# Patient Record
Sex: FEMALE | Race: WHITE
[De-identification: names, ages, dates, MRNs, and addresses within clinical notes are randomized per-mention and may not be internally consistent; named-entity substitution may affect disease eponyms.]

---

## 2017-07-07 NOTE — ED
Lower Extremity





- HPI Summary


HPI Summary: 





Patient presents with right foot "abscess" since 2 weeks.  There is one 1.5x2cm 

erythematous area to the right base of the lateral great toe with central 

avulsion of skin with brown sanguineous drainage and one 1x1cm area similar in 

appearance to the right achilles.  She denies wearing shoes or anything rubbing 

against the skin to create a blister.  PMHx includes raynauds, "allergic to sun,

" connective tissue disorder, fibromyalgia, anxiety and depression.  Denies HA, 

fevers, chills or sweats.





- History of Current Complaint


Chief Complaint: EDRashSkinAbscess


Stated Complaint: SORES ON RT FOOT


Time Seen by Provider: 17 19:50


Hx Obtained From: Patient


Pain Intensity: 7


Pain Scale Used: 0-10 Numeric


Timing: Constant


Character Of Pain: Aching


Associated Signs And Symptoms: Positive: Redness


Aggravating Factor(s): Standing, Ambulation


Alleviating Factor(s): Rest


Able to Bear Weight: No





- Risk Factors


Gout Risk Factors: Obesity, Peripherial Vascular Disease


DVT Risk Factors: Recent Period Of Bedrest


Septic Arthritis Risk Factor: Immunosuppressed, Pre-existing Joint Disease





- Allergies/Home Medications


Allergies/Adverse Reactions: 


 Allergies











Allergy/AdvReac Type Severity Reaction Status Date / Time


 


Clarithromycin [From Biaxin] Allergy  Unknown Verified 16 17:33





   Reaction  





   Details  














PMH/Surg Hx/FS Hx/Imm Hx


Previously Healthy: Yes


Endocrine/Hematology History: 


   Denies: Hx Diabetes


Cardiovascular History: 


   Denies: Hx Congestive Heart Failure, Hx Hypertension


 History: 


   Denies: Hx Renal Disease





- Surgical History


Surgery Procedure, Year, and Place: CHOLECYSTECTOMY, , TUBAL LIGATION





- Immunization History


Hx Pertussis Vaccination: No


Immunizations Up to Date: Unable to Obtain/Confirm


Infectious Disease History: No


Infectious Disease History: 


   Denies: Traveled Outside the [place] in Last 30 Days





- Social History


Occupation: Unemployed


Lives: With Family


Alcohol Use: None


Hx Substance Use: No


Substance Use Type: Reports: None


Hx Tobacco Use: Yes


Smoking Status (MU): Heavy Every Day Tobacco Smoker


Do You Chew or Dip Tobacco: Yes





Review of Systems


Constitutional: Negative


Eyes: Negative


Cardiovascular: Negative


Respiratory: Negative


Positive: no symptoms reported, see HPI


Musculoskeletal: Negative


Positive: Other - see HPI


Neurological: Negative


Psychological: Normal


All Other Systems Reviewed And Are Negative: Yes





Physical Exam


Triage Information Reviewed: Yes


Vital Signs On Initial Exam: 


 Initial Vitals











Temp Pulse Resp BP Pulse Ox


 


 96.9 F   106   20   125/85   98 


 


 17 19:21  17 19:21  17 19:21  17 19:21  17 19:21











Vital Signs Reviewed: Yes


Appearance: Positive: Well-Appearing, No Pain Distress, Well-Nourished


Skin: Positive: Warm, Skin Color Reflects Adequate Perfusion, Other - see HPI


Head/Face: Positive: Normal Head/Face Inspection


Eyes: Positive: EOMI, MIKAL


Neck: Positive: Supple, No Lymphadenopathy


Respiratory/Lung Sounds: Positive: Clear to Auscultation, Breath Sounds Present


Cardiovascular: Positive: Normal, RRR, Pulses are Symmetrical in both Upper and 

Lower Extremities


Musculoskeletal: Positive: Normal, Strength/ROM Intact


Neurological: Positive: Normal, Sensory/Motor Intact, Alert, Oriented to Person 

Place, Time


Psychiatric: Positive: Normal





Diagnostics





- Vital Signs


 Vital Signs











  Temp Pulse Resp BP Pulse Ox


 


 17 20:34  97.4 F  87  16  108/67  100


 


 17 19:21  96.9 F  106  20  125/85  98














- Laboratory


Lab Statement: Any lab studies that have been ordered have been reviewed, and 

results considered in the medical decision making process.





Lower Extremity Course/Dx





- Course


Course Of Treatment: Patient presents with right foot "abscess" since 2 weeks.  

There is one 1.5x2cm erythematous area to the right base of the lateral great 

toe with central avulsion of skin with brown sanguineous drainage and one 1x1cm 

area similar in appearance to the right achilles.  The area is slightly swollen 

with tenderness and warmth.  She denies wearing shoes or anything rubbing 

against the skin to create a blister.  PMHx includes raynauds, "allergic to sun,

" connective tissue disorder, fibromyalgia, anxiety and depression.  Denies HA, 

fevers, chills or sweats. Patient given keflex for potential infection.  Cleaned

, telfa dressing applied and gauze wrapped. Encouraged removal of wraps in 1 x 

day and leave open to air. Patient OK with discharge.





- Diagnoses


Differential Diagnosis/HQI/PQRI: Positive: Other - cellulitis, abscess, skin 

disorder


Provider Diagnoses: 


 Cellulitis








Discharge





- Discharge Plan


Condition: Stable


Disposition: HOME


Prescriptions: 


Cephalexin CAP* [Keflex CAP*] 500 mg PO QID #28 cap MDD 4


Patient Education Materials:  Cellulitis (ED)


Referrals: 


Aime ARIAS,Juan EUBANKS [Primary Care Provider] - 


Additional Instructions: 


Follow up with PCP


Leave open to air starting tomorrow


Keflex 4 times daily for 7 days


Avoid shoes rubbing on the area.


Continue with warm soaks with epsom salt

## 2017-08-31 NOTE — ED
Substance Abuse/Use





- HPI Summary


HPI Summary: 





Patient presents with symptoms of withdrawal from Paxil.  She has run out of 

the medication 3 days ago and has attempted to call her PCP office for over a 

week without having a call back.  She notes to anxiety, insomnia, shaking spells

, body aches, twitches and irritability. She currently takes 20mg Paxil daily 

for symptoms of polymyalgia rheumatica, depression, body aches and insomnia.  

She states the medication has helped with her symptoms. She denies fevers, 

sweats or chills.  Denies sick contacts or feelings of ill.   at 

bedside.  Denies SI/HI or self harm.  Denies psych history, but states she 

notes to intermittent depression which has been well controlled with her Paxil.

  Denies HA, abdominal pain or urinary symptoms. 





- History Of Current Complaint


Chief Complaint: EDGeneral


Stated Complaint: WITHDRAWL FROM PAXEL/ANXIETY


Time Seen by Provider: 17 00:50


Hx Obtained From: Patient


Pregnant?: No


Onset/Duration  of Drug/ETOH Abuse: Days


Ingestion History: Type/Name Of Drug - ssri


Severity Initially: Moderate


Severity Currently: Moderate


Character: Depressed, Fearful, Angry


Aggravating Factor(s): Nothing


Alleviating Factor(s): Nothing


Associated Signs And Symptoms: Confused, Paranoid Behavior, Sleep Disturbance, 

Tremulous





- Risk Factor(s)


Completed Suicide Risk Factors: Negative





- Allergies/Home Medications


Allergies/Adverse Reactions: 


 Allergies











Allergy/AdvReac Type Severity Reaction Status Date / Time


 


Clarithromycin [From Biaxin] Allergy  Unknown Verified 17 22:03





   Reaction  





   Details  














PMH/Surg Hx/FS Hx/Imm Hx


Previously Healthy: Yes


Endocrine/Hematology History: 


   Denies: Hx Diabetes


Cardiovascular History: 


   Denies: Hx Congestive Heart Failure, Hx Hypertension


 History: 


   Denies: Hx Renal Disease





- Surgical History


Surgery Procedure, Year, and Place: CHOLECYSTECTOMY, , TUBAL LIGATION





- Immunization History


Hx Pertussis Vaccination: No


Immunizations Up to Date: Unable to Obtain/Confirm


Infectious Disease History: No


Infectious Disease History: 


   Denies: Traveled Outside the [place] in Last 30 Days





- Social History


Occupation: Unemployed


Lives: With Family


Alcohol Use: None


Hx Substance Use: No


Substance Use Type: Reports: None


Hx Tobacco Use: Yes


Smoking Status (MU): Heavy Every Day Tobacco Smoker





Review of Systems


Constitutional: Negative


Negative: Fever, Chills


Eyes: Negative


Negative: Blurred Vision, Erythema


ENT: Negative


Positive: Palpitations.  Negative: Chest Pain


Positive: Shortness Of Breath


Gastrointestinal: Negative


Negative: no symptoms reported, see HPI


Positive: Myalgia - baseline


Skin: Negative


Positive: Anxious, Depressed


All Other Systems Reviewed And Are Negative: Yes





Physical Exam


Triage Information Reviewed: Yes


Vital Signs On Initial Exam: 


 Initial Vitals











Temp Pulse Resp BP Pulse Ox


 


 97.4 F   99   16   151/100   100 


 


 17 22:04  17 22:04  17 22:04  17 22:04  17 22:04











Completion Of Physical Exam Limited Due To: Dementia


Appearance: Positive: Well-Appearing, Well-Nourished


Skin: Positive: Warm, Skin Color Reflects Adequate Perfusion


Head/Face: Positive: Normal Head/Face Inspection


Eyes: Positive: EOMI, MIKAL, Conjunctiva Clear


Neck: Positive: Supple, No Lymphadenopathy


Respiratory/Lung Sounds: Positive: Clear to Auscultation, Breath Sounds Present


Cardiovascular: Positive: Normal, RRR, Pulses are Symmetrical in both Upper and 

Lower Extremities


Musculoskeletal: Positive: Normal, Strength/ROM Intact


Neurological: Positive: Alert, Oriented to Person Place, Time, Speech Normal


Psychiatric: Positive: Anxious





Diagnostics





- Vital Signs


 Vital Signs











  Temp Pulse Resp BP Pulse Ox


 


 17 23:21  97.6 F  83  16  124/90  96


 


 17 22:04  97.4 F  99  16  151/100  100














- Laboratory


Lab Results: 


 Lab Results











  17 Range/Units





  23:47 23:47 


 


WBC   13.3 H  (3.5-10.8)  10^3/ul


 


RBC   4.31  (4.0-5.4)  10^6/ul


 


Hgb   13.5  (12.0-16.0)  g/dl


 


Hct   40  (35-47)  %


 


MCV   93  (80-97)  fL


 


MCH   31  (27-31)  pg


 


MCHC   34  (31-36)  g/dl


 


RDW   15  (10.5-15)  %


 


Plt Count   192  (150-450)  10^3/ul


 


MPV   10  (7.4-10.4)  um3


 


Neut % (Auto)   64.9  (38-83)  %


 


Lymph % (Auto)   26.9  (25-47)  %


 


Mono % (Auto)   6.7  (1-9)  %


 


Eos % (Auto)   1.1  (0-6)  %


 


Baso % (Auto)   0.4  (0-2)  %


 


Absolute Neuts (auto)   8.6 H  (1.5-7.7)  10^3/ul


 


Absolute Lymphs (auto)   3.6  (1.0-4.8)  10^3/ul


 


Absolute Monos (auto)   0.9 H  (0-0.8)  10^3/ul


 


Absolute Eos (auto)   0.1  (0-0.6)  10^3/ul


 


Absolute Basos (auto)   0.1  (0-0.2)  10^3/ul


 


Absolute Nucleated RBC   0  10^3/ul


 


Nucleated RBC %   0  


 


Sodium  139   (133-145)  mmol/L


 


Potassium  3.9   (3.5-5.0)  mmol/L


 


Chloride  104   (101-111)  mmol/L


 


Carbon Dioxide  29   (22-32)  mmol/L


 


Anion Gap  6   (2-11)  mmol/L


 


BUN  17   (6-24)  mg/dL


 


Creatinine  1.20 H   (0.51-0.95)  mg/dL


 


Est GFR ( Amer)  64.7   (>60)  


 


Est GFR (Non-Af Amer)  50.3   (>60)  


 


BUN/Creatinine Ratio  14.2   (8-20)  


 


Glucose  97   ()  mg/dL


 


Calcium  9.3   (8.6-10.3)  mg/dL


 


Total Bilirubin  0.30   (0.2-1.0)  mg/dL


 


AST  16   (13-39)  U/L


 


ALT  15   (7-52)  U/L


 


Alkaline Phosphatase  63   ()  U/L


 


Total Protein  6.8   (6.4-8.9)  g/dL


 


Albumin  4.0   (3.2-5.2)  g/dL


 


Globulin  2.8   (2-4)  g/dL


 


Albumin/Globulin Ratio  1.4   (1-3)  


 


TSH  Pending   


 


Salicylates  < 2.50   (<30)  mg/dL


 


Acetaminophen  < 15   mcg/mL


 


Serum Alcohol  < 10   (<10)  mg/dL











Result Diagrams: 


 17 23:47





 17 23:47


Lab Statement: Any lab studies that have been ordered have been reviewed, and 

results considered in the medical decision making process.





Course/Dx





- Course


Course Of Treatment: Patient educated about paxil/ ssri's and abruptly stopping 

or rapidly tapering antidepressants causing these adverse effects.   The 

constellation of discontinuation effects, sometimes referred to as a 

"discontinuation syndrome," was explained to the patient and she agrees with 

symptoms related to paxil discontinuation.  She is given ativan 1mg in ED and 

given 20mg paxil.  I have agreed to give her 20mg paxil x 14 days.  Medication 

history was reviewed and it was confirmed patient has no SI or attempts of 

suicide and has never abused a medication or opioid.  Patient denies any 

urinary symptoms.  US shows trace leuks and 3+ WBC.  Patient left before UA was 

back. Will await sensitivities and call with results.





- Diagnoses


Differential Diagnosis/HQI/PQRI: Positive: Depression, Drug Abuse, Drug 

Withdrawal


Provider Diagnoses: 


 Paxil withdrawal syndrome








Discharge





- Discharge Plan


Condition: Stable


Disposition: HOME


Prescriptions: 


PARoxetine HCL TAB* [Paxil TAB*] 20 mg PO DAILY #14 tab


Patient Education Materials:  Anxiety (ED)


Referrals: 


Aime ARIAS,Juan EUBANKS [Primary Care Provider] - 


Additional Instructions: 


DISCONTINUATION SYMPTOMS  Abruptly stopping or rapidly tapering antidepressants 

often causes adverse effects, especially if a new drug with overlapping 

pharmacodynamic activity is not started. The constellation of discontinuation 

effects, sometimes referred to as a "discontinuation syndrome," has been best 

characterized in patients who abruptly stop selective serotonin reuptake 

inhibitors (SSRIs). The most common discontinuation symptoms include:





Dizziness





Fatigue





Headache





Nausea





Agitation





Anxiety





Chills





Diaphoresis





Dysphoria





Insomnia





Irritability





Myalgias





Paresthesias





Rhinorrhea 





Tremor





You have been started back on your daily dose of paxil. This medication should 

only be used as prescribed and should alleviate some of the symptoms you have 

been feeling.

## 2017-09-02 NOTE — PN
Progress Note





- Progress Note


Date of Service: 09/02/17


Note: 


Patient sarah beth grew E coli >100,000. sent script for macrobid 100mg bid x7days  

as final culture shows is sensitive to. left vm explaining results and that 

script was sent in.

## 2017-09-03 NOTE — ED
Progress





- Progress Note


Progress Note: 


Pt's urine cx is sensitive to nitrofurantoin - pt already taking - no change in 

meds at this time.  





Course/Dx





- Course


Course Of Treatment: Patient educated about paxil/ ssri's and abruptly stopping 

or rapidly tapering antidepressants causing these adverse effects.   The 

constellation of discontinuation effects, sometimes referred to as a 

"discontinuation syndrome," was explained to the patient and she agrees with 

symptoms related to paxil discontinuation.  She is given ativan 1mg in ED and 

given 20mg paxil.  I have agreed to give her 20mg paxil x 14 days.  Medication 

history was reviewed and it was confirmed patient has no SI or attempts of 

suicide and has never abused a medication or opioid.  Patient denies any 

urinary symptoms.  US shows trace leuks and 3+ WBC.  Patient left before UA was 

back. Will await sensitivities and call with results.





- Diagnoses


Provider Diagnoses: 


 Paxil withdrawal syndrome

## 2017-09-07 NOTE — ED
Qasim KOLB Benjamin, scribed for Kody Michaels MD on 17 at 1327 .





Neurological HPI





- HPI Summary


HPI Summary: 





38yo female comes into ED after a witnessed Sz episode lasting 10-15 seconds at 

0730 today. Pt felt funny after taking a hot shower and had her episode, 

which was witnessed by her . Pt also reports some HA. Pt also reports 

feeling hot. Pt was shaking and foaming. Pt had another episode around 13:05 

while at the waiting room, lasting about 5 seconds.





- History of Current Complaint


Chief Complaint: EDSeizure


Stated Complaint: SEIZURE


Time Seen by Provider: 17 13:10


Hx Obtained From: Patient, Family/Caretaker - 


Onset/Duration: Sudden Onset, Started hours ago - at 0730 today, Resolved


Timing: Sudden Onset


Onset Severity: Moderate


Current Severity: None


Seizure Severity: Moderate


Number of Seizures: 2


Pain Intensity: 5


Character: Other: - HA





- Allergy/Home Medications


Allergies/Adverse Reactions: 


 Allergies











Allergy/AdvReac Type Severity Reaction Status Date / Time


 


Clarithromycin [From Biaxin] Allergy  Unknown Verified 17 22:03





   Reaction  





   Details  











Home Medications: 


 Home Medications





Hydrocodone-Acetaminophen [Lorcet Plus 7.5-325 mg] 1 tab PO BID PRN 17 [

History Confirmed 17]











PMH/Surg Hx/FS Hx/Imm Hx


Endocrine/Hematology History: 


   Denies: Hx Diabetes


Cardiovascular History: 


   Denies: Hx Congestive Heart Failure, Hx Hypertension


 History: 


   Denies: Hx Renal Disease





- Surgical History


Surgery Procedure, Year, and Place: CHOLECYSTECTOMY, , TUBAL LIGATION


Infectious Disease History: 


   Denies: Traveled Outside the [place] in Last 30 Days





- Family History


Known Family History: Positive: Hypertension





- Social History


Occupation: Employed Full-time


Lives: With Family


Alcohol Use: None


Hx Substance Use: No


Substance Use Type: Reports: None


Hx Tobacco Use: Yes


Smoking Status (MU): Heavy Every Day Tobacco Smoker





Review of Systems


Positive: Fatigue, Other - hot flushes


Eyes: Negative


ENT: Negative


Cardiovascular: Negative


Respiratory: Negative


Gastrointestinal: Negative


Genitourinary: Negative


Musculoskeletal: Negative


Skin: Negative


Neurological: Negative


Psychological: Normal


All Other Systems Reviewed And Are Negative: Yes





Physical Exam


Triage Information Reviewed: Yes


Vital Signs On Initial Exam: 


 Initial Vitals











Temp Pulse Resp BP Pulse Ox


 


 98.0 F   95   20   145/71   95 


 


 17 12:41  17 12:41  17 12:41  17 12:41  17 12:41











Vital Signs Reviewed: Yes


Appearance: Positive: Well-Appearing, No Pain Distress, Obese


Skin: Positive: Warm, Skin Color Reflects Adequate Perfusion, Dry, Other - pt 

feels hot


Head/Face: Positive: Normal Head/Face Inspection


Eyes: Positive: EOMI, MIKAL


ENT: Positive: Normal ENT inspection, Hearing grossly normal


Neck: Positive: Supple, Nontender


Respiratory/Lung Sounds: Positive: Clear to Auscultation, Breath Sounds Present


Cardiovascular: Positive: RRR, Pulses are Symmetrical in both Upper and Lower 

Extremities


Abdomen Description: Positive: Nontender, Soft


Bowel Sounds: Positive: Present


Musculoskeletal: Positive: Strength/ROM Intact


Neurological: Positive: Sensory/Motor Intact, Alert, Oriented to Person Place, 

Time.  Negative: Focal Deficit @


Psychiatric: Positive: Affect/Mood Appropriate





Diagnostics





- Vital Signs


 Vital Signs











  Temp Pulse Resp BP Pulse Ox


 


 17 12:41  98.0 F  95  20  145/71  95














- Laboratory


Lab Results: 


 Lab Results











  17 Range/Units





  13:49 13:49 13:49 


 


WBC    14.6 H  (3.5-10.8)  10^3/ul


 


RBC    4.48  (4.0-5.4)  10^6/ul


 


Hgb    13.9  (12.0-16.0)  g/dl


 


Hct    41  (35-47)  %


 


MCV    92  (80-97)  fL


 


MCH    31  (27-31)  pg


 


MCHC    34  (31-36)  g/dl


 


RDW    15  (10.5-15)  %


 


Plt Count    186  (150-450)  10^3/ul


 


MPV    9  (7.4-10.4)  um3


 


Neut % (Auto)    79.0  (38-83)  %


 


Lymph % (Auto)    9.6 L  (25-47)  %


 


Mono % (Auto)    7.2  (1-9)  %


 


Eos % (Auto)    3.1  (0-6)  %


 


Baso % (Auto)    1.1  (0-2)  %


 


Absolute Neuts (auto)    11.6 H  (1.5-7.7)  10^3/ul


 


Absolute Lymphs (auto)    1.4  (1.0-4.8)  10^3/ul


 


Absolute Monos (auto)    1.0 H  (0-0.8)  10^3/ul


 


Absolute Eos (auto)    0.5  (0-0.6)  10^3/ul


 


Absolute Basos (auto)    0.2  (0-0.2)  10^3/ul


 


Absolute Nucleated RBC    0  10^3/ul


 


Nucleated RBC %    0  


 


INR (Anticoag Therapy)   0.98   (0.89-1.11)  


 


APTT   30.8   (26.0-36.3)  seconds


 


Sodium  137    (133-145)  mmol/L


 


Potassium  4.0    (3.5-5.0)  mmol/L


 


Chloride  105    (101-111)  mmol/L


 


Carbon Dioxide  24    (22-32)  mmol/L


 


Anion Gap  8    (2-11)  mmol/L


 


BUN  9    (6-24)  mg/dL


 


Creatinine  1.06 H    (0.51-0.95)  mg/dL


 


Est GFR ( Amer)  74.6    (>60)  


 


Est GFR (Non-Af Amer)  58.0    (>60)  


 


BUN/Creatinine Ratio  8.5    (8-20)  


 


Glucose  84    ()  mg/dL


 


Lactic Acid     (0.5-2.0)  mmol/L


 


Calcium  9.4    (8.6-10.3)  mg/dL


 


Magnesium  2.0    (1.9-2.7)  mg/dL


 


Total Bilirubin  0.80    (0.2-1.0)  mg/dL


 


AST  16    (13-39)  U/L


 


ALT  14    (7-52)  U/L


 


Alkaline Phosphatase  66    ()  U/L


 


Total Creatine Kinase  69    ()  U/L


 


CK-MB (CK-2)  1.8    (0.6-6.3)  ng/mL


 


Troponin I  0.01    (<0.04)  ng/mL


 


C-Reactive Protein  41.80 H    (< 5.00)  mg/L


 


Total Protein  6.9    (6.4-8.9)  g/dL


 


Albumin  3.7    (3.2-5.2)  g/dL


 


Globulin  3.2    (2-4)  g/dL


 


Albumin/Globulin Ratio  1.2    (1-3)  


 


Lipase  74    (11.0-82.0)  U/L


 


TSH  1.94    (0.34-5.60)  mcIU/mL


 


Beta HCG, Quant  0.89    mIU/mL














  17 Range/Units





  13:49 


 


WBC   (3.5-10.8)  10^3/ul


 


RBC   (4.0-5.4)  10^6/ul


 


Hgb   (12.0-16.0)  g/dl


 


Hct   (35-47)  %


 


MCV   (80-97)  fL


 


MCH   (27-31)  pg


 


MCHC   (31-36)  g/dl


 


RDW   (10.5-15)  %


 


Plt Count   (150-450)  10^3/ul


 


MPV   (7.4-10.4)  um3


 


Neut % (Auto)   (38-83)  %


 


Lymph % (Auto)   (25-47)  %


 


Mono % (Auto)   (1-9)  %


 


Eos % (Auto)   (0-6)  %


 


Baso % (Auto)   (0-2)  %


 


Absolute Neuts (auto)   (1.5-7.7)  10^3/ul


 


Absolute Lymphs (auto)   (1.0-4.8)  10^3/ul


 


Absolute Monos (auto)   (0-0.8)  10^3/ul


 


Absolute Eos (auto)   (0-0.6)  10^3/ul


 


Absolute Basos (auto)   (0-0.2)  10^3/ul


 


Absolute Nucleated RBC   10^3/ul


 


Nucleated RBC %   


 


INR (Anticoag Therapy)   (0.89-1.11)  


 


APTT   (26.0-36.3)  seconds


 


Sodium   (133-145)  mmol/L


 


Potassium   (3.5-5.0)  mmol/L


 


Chloride   (101-111)  mmol/L


 


Carbon Dioxide   (22-32)  mmol/L


 


Anion Gap   (2-11)  mmol/L


 


BUN   (6-24)  mg/dL


 


Creatinine   (0.51-0.95)  mg/dL


 


Est GFR ( Amer)   (>60)  


 


Est GFR (Non-Af Amer)   (>60)  


 


BUN/Creatinine Ratio   (8-20)  


 


Glucose   ()  mg/dL


 


Lactic Acid  0.9  (0.5-2.0)  mmol/L


 


Calcium   (8.6-10.3)  mg/dL


 


Magnesium   (1.9-2.7)  mg/dL


 


Total Bilirubin   (0.2-1.0)  mg/dL


 


AST   (13-39)  U/L


 


ALT   (7-52)  U/L


 


Alkaline Phosphatase   ()  U/L


 


Total Creatine Kinase   ()  U/L


 


CK-MB (CK-2)   (0.6-6.3)  ng/mL


 


Troponin I   (<0.04)  ng/mL


 


C-Reactive Protein   (< 5.00)  mg/L


 


Total Protein   (6.4-8.9)  g/dL


 


Albumin   (3.2-5.2)  g/dL


 


Globulin   (2-4)  g/dL


 


Albumin/Globulin Ratio   (1-3)  


 


Lipase   (11.0-82.0)  U/L


 


TSH   (0.34-5.60)  mcIU/mL


 


Beta HCG, Quant   mIU/mL











Result Diagrams: 


 17 13:49





 17 13:49


Lab Statement: Any lab studies that have been ordered have been reviewed, and 

results considered in the medical decision making process.





- Radiology


  ** CXR


Xray Interpretation: Positive (See Comments) - IMPRESSION:  EXPIRATORY EXAM, 

SMALL BIBASILAR INFILTRATES.


Radiology Interpretation Completed By: Radiologist - ED physician has reviewed 

this radiology report and agrees.





Course/Dx





- Course


Course Of Treatment: Reviewed pts medication and allergy lists. High blood 

pressure noted.  Discussed with Dr. Williamson (Neurologist) at 13:28.  Discussed 

with Dr. Saldaña (Hospitalist) at 14:58 for admission.  DR WILLIAMSON, NEUROLOGIST, 

SAW PATIENT IN ED.  ADMIT HOSPITALIST.





- Diagnoses


Provider Diagnoses: 


 New onset seizure








Discharge





- Discharge Plan


Condition: Stable


Disposition: ADMITTED TO [facility] documentation as recorded by the Qasim rowland Benjamin accurately reflects 

the service I personally performed and the decisions made by me, Kody Michaels MD.

## 2017-09-07 NOTE — HP
CC:  Dr. Salomon *

 

[facility] MEDICINE HISTORY AND PHYSICAL:

 

DATE OF ADMISSION:  17

 

PRIMARY CARE PHYSICIAN:  Dr. Salomon.

 

ATTENDING PHYSICIAN:  Dr. Sultana Saldaña * (dictation provided by Latasha Gerard NP
).

 

CHIEF COMPLAINT:  Syncope with seizure.

 

HISTORY OF PRESENT ILLNESS:  Ms. Nash is a 37-year-old female with a past 
medical history of rheumatoid arthritis; trigeminal neuralgia, on 
immunosuppressants, who presents to the hospital today with concern for 
episodes of syncope with seizure-like symptoms.  Ms. Nash states that she 
recently ran out of her prescription for Paxil and was off of her Paxil for 
three days.  With this, she was feeling very poorly, with shaking, anxiety and 
nausea.  She therefore presented to the emergency room on 17.  At that 
time, she was given Paxil prescription and  discharged to home. A couple of 
days later she received a call that her urinalysis was positive.  She had had 
trace leuk esterase on 17 as well as 3+ bacteria.  The microbiology 
report was positive for E. coli and the patient was given a prescription for 
nitrofurantoin, which she started.  She states she has had three days of 
nitrofurantoin.  She denies having any symptoms of dysuria although she gives 
the caveat that she has a very high pain intolerance.  She also denies 
frequency which, but also gives the caveat that she typically urinates a lot.  
The patient went to see Dr. Wood in routine followup and the thought at that 
time was for possibly increasing her gabapentin with an eye towards decreasing 
her methotrexate dose overtime.  She was feeling well.  This morning, she got 
up to go take a shower.  While in the shower, she felt that her ears were 
plugged and that her vision was darkening.  She went to step out of the shower 
and states that she passed out.  Her mother came into the room and saw that she 
was foaming at the mouth and noticed that she was shaking.  She vomited.  She 
had lost control of her bowels.  She afterwards took a nap for a couple of 
hours and ultimately decided to come to the emergency room.

 

In the emergency room, Ms. Nash had another episode while sitting in a chair
, where she felt that her vision was going dark and she then went unconcious.  
Staff  noted her to be shaking. She has no memory of those events and feels 
that she did lose consciousness.  Bystanders described her as looking like she 
might be having a seizure.  She did not have loss of bowel or bladder at that 
point.

 

Labs today show mildly elevated white blood cell count of 14.6.  She has a mild 
elevation in her CRP to 41.80.  She has a chest x-ray, which shows no acute 
abnormality.  She was seen in consultation by Dr. Williamson from the neurology 
group, who recommends that she go on for a brain MRI and further workup for the 
syncope.

 

PAST MEDICAL HISTORY:

1.  Rheumatoid arthritis.

2.  Trigeminal neuralgia.

3.  History of cholecystectomy.

4.   x2.

5.  Tubal ligation.

 

MEDICATIONS:

1.  Hydrocodone/acetaminophen 7.5/325 mg 1 tab p.o. b.i.d. p.r.n.

2.  Methotrexate every Wednesday as directed.

3.  Plaquenil 400 mg p.o. daily.

4.  Nitrofurantoin 100 mg p.o. b.i.d.  The patient is on day three of therapy.

5.  Paxil 20 mg p.o. daily.

6.  Zolpidem 20 mg p.o. at bedtime.

7.  Folic acid daily.

8.  Vitamin B daily.

7.  Vitamin D daily.

8.  Allegra daily.

 

FAMILY HISTORY:  The patient reports her mother and father are alive and well.  
Her mother's mother had  from congestive heart failure.  Her mother's 
father  related to COPD.  Her father's mother and father  related to 
cancer.

 

SOCIAL HISTORY:  The patient had quit for sometime, but recently resumed 
smoking. She is smoking 10 to 15 cigarettes per day.  She denies any alcohol or 
drug use. She states her  is the healthcare proxy.

 

REVIEW OF SYSTEMS:  A 14-point review of systems was completed with Ms. Nash 
and all those not mentioned above were negative.

 

                               PHYSICAL EXAMINATION

 

GENERAL:  Ms. Nash is sitting up in the bed.  She is in no acute distress.

 

VITAL SIGNS:  Temperature 98.3, pulse rate 89, respiratory rate 18, O2 
saturation 100% on room air, blood pressure 145/93.

 

LUNGS:  Clear to auscultation bilaterally with no accessory muscle use and good 
aeration.

 

HEART:  S1, S2.  No murmur, rub or gallop and regular.

 

ABDOMEN:  Soft and nontender with bowel sounds positive x4.

 

EXTREMITIES:  No cyanosis, no edema.

 

NEURO:  She is alert.  She is oriented x3.  She moves all extremities equally. 
There is no facial asymmetry or focal weakness.  Extraocular movements are 
intact.

 

SKIN:  Intact.

 

 LABORATORY DATA:  WBC 14.6, hemoglobin 13.9, hematocrit 41, platelet count 186
, INR 0.98.  Sodium 137, potassium 4.0, chloride 105, serum bicarbonate 24, BUN 
9, creatinine 1.06, glucose is 84.  Troponin 0.01, CRP 41.80, TSH 1.94, beta-
HCG 0.89. Urine shows no evidence of infection.  Again, chest x-ray is a poor 
exam, but _____ bibasilar infiltrates, but is an expiratory film.

 

ASSESSMENT AND PLAN:  Ms. Nash is a 37-year-old female with a past medical 
history of rheumatoid arthritis and trigeminal neuralgia, on methotrexate and 
Plaquenil routinely, who presents to the hospital today with concern for 
described syncopal episodes followed by seizure.  Plans are for observation in 
the hospital for the followin.  Syncope with concern for seizure:  Again, I think her seizure is likely 
precipitated by her syncopal episode.  At this point, there is no indication 
for starting antiepileptics.  An EEG has been ordered.  This has been reviewed 
with Dr. Williamson.  He does recommend that she have an MRI with and without 
contrast given her history of chronic immunosuppression.  Also, plan to monitor 
for arrhythmias with telemetry and to check for any valvular or abnormalities 
or other cardiac structural abnormalities with a transthoracic echocardiogram 
tomorrow.  The patient will have orthostatic vitals.

2.  Leukocytosis:  The patient's white blood cell count was elevated to 13 at 
last admission on 17, is now up to 14.6.  She has a very mild elevation 
in her CRP to 40.  Plan to add on a procalcitonin to further characterize any 
possible infection.  She did have signs of urinary tract infection with E. coli
, which is sensitive to nitrofurantoin.  She has been on nitrofurantoin and the 
urinalysis does not appear to be positive.  She has question of an infiltrate 
on the chest x- ray, but I think due to her body habitus and poor quality of 
the film, this is difficult to interpret.  Regardless, she has no shortness of 
breath or cough making pneumonia less likely, I do not see any indication for 
antibiotic at this time.

3.  History of rheumatoid arthritis:  Continue home medications.

4.  DVT prophylaxis with SCDs.

5.  Code status is full code.

 

TIME SPENT:  Approximately 60 minutes were spent on the admission of this 
patient, more than half the time spent with the patient at the bedside 
reviewing the events leading up to this hospitalization, performing the 
physical examination, and reviewing the plan of care.

 

 ____________________________________ LATASHA GERARD NP

 

[id number]/[facility] #: [id number]

[place]

## 2017-09-07 NOTE — CONS
CONSULTATION REPORT:

 

DATE OF CONSULT:  09/07/17

 

PATIENT OF:  Dr. Michaels and Dr. Salomon.

 

HISTORY:  This is a 37-year-old woman I am asked to evaluate for 2 possible 
seizures today.  She notes she has at least a 3-year history of syncope often 
without warning symptoms with some darkening of vision, where she will go 
limply down.  These are often when she gets hot and she usually thinks there is 
a trigger but sometimes there is not.  She was in the shower this morning and 
she developed tunneled vision and wooziness and then she lost consciousness.  
Her  heard her fall and found her having a generalized convulsion 
lasting for 10 to 15 seconds and was confused after which she was brought into 
the ER.  While sitting in her seat in the waiting room, she had some darkening 
of vision and then briefly went limp and then had a generalized convulsion for 
5 or 10 seconds.  She has never had convulsions before.  Of note, she has 
rheumatoid arthritis, polyarthritis and is on Plaquenil, Ambien, methotrexate.  
She also has trigeminal neuralgia with sharp shooting pain to the right side 
intermittently for the past year or two and she was going to begin on Neurontin 
and the script has been written, but she has not picked it up yet.  She is also 
on Paxil.  She was recently in the emergency room for urinary tract infection 
and also had symptoms of Paxil withdrawal.  When her Paxil ran out, she became 
anxious and sweaty but she resumed her Paxil a few days ago and has been fine 
without symptoms.

 

MEDICATIONS:  Medicine doses include:

1.  Macrobid 100 mg twice a day.

2.  Paxil 20 mg a day.

 

The dose of her Plaquenil, Ambien, methotrexate is not known.

 

She got 500 mg of Keppra in the emergency room.

 

ALLERGIES:  She is allergic to CLARITHROMYCIN.

 

SOCIAL HISTORY:  She does not drink or use drugs.  She does smoke heavily every 
day.

 

REVIEW OF SYSTEMS:  Negative in all 14 spheres, other than HPI.  She has had no 
recent surgeries or procedures.

 

FAMILY HISTORY:  Negative for seizures or neuralgia.

 

PHYSICAL EXAMINATION:  Temperature 98.8, pulse 87, respirations 19, blood 
pressure 133/119.  She is alert and oriented with normal speech and 
comprehension.  Cranial nerves II through XII intact.  Fundi were benign.  
There is no temporal tenderness. Motor exam revealed normal tone and strength.  
Finger to nose sensation intact to light touch.  Reflexes 2 and equal.  
Downgoing toes.  Chest:  Clear. Cardiovascular:  Regular rate and rhythm.  
Abdomen:  Soft with positive bowel sounds.  Neck was supple.

 

DIAGNOSTIC STUDIES/LAB DATA:  Her EEG was normal.  White count was 14.6 with 
normal hematocrit and platelet count.  INR 0.98.  PTT 30.8.  Normal CMP other 
than a creatinine of 1.06.  C-reactive protein 41.8.  There was elevated normal 
beta-hCG and TSH.

 

IMPRESSION:  It sounds like she has had longstanding syncopal episodes that had 
been infrequent in the past 6 months but had been occurring 2 to 3 times a week 
for a long time prior to that.  She never had convulsions with her syncope 
before, but this is what her story sounds like.  Now, I had asked Dr. Michaels 
to check if this was the case and if not, she could get loaded with Keppra.  
His initial history was that these were 2 primary seizures but I do not believe 
this is the case.  I think these were provoked.  However, given her trigeminal 
neuralgia, she needs an MRI scan and given her autoimmune disease, it should be 
done with and without contrast. This would have been part of the seizure workup 
if these were unprovoked seizures, but for her trigeminal neuralgia, she will 
need this in any event.  Some of her syncopal episodes occur with sitting and 
without provocation.  She should get a workup as dictated by the ER physician 
and the hospitalist, and she will be admitted to get workup for further 
evaluation and being on a monitored bed overnight.

 

Thank you for sharing her case.

 

 [id number]/[facility] #: [id number]

[place]

## 2017-09-07 NOTE — RAD
INDICATION:  Seizure x2.



COMPARISON:  Comparison is made with a prior chest x-ray study from August 15, 2015.



TECHNIQUE: A portable view of the chest was obtained.



FINDINGS: Cardiac and mediastinal contours appear to be within normal limits.



The lungs are underinflated. There are small infiltrates at both lung bases.



IMPRESSION:  EXPIRATORY EXAM, SMALL BIBASILAR INFILTRATES.

## 2017-09-07 NOTE — RAD
Indication: New onset seizures.



Image sequences: Sagittal and axial T1, axial T2, FLAIR, diffusion and susceptibility

weighted images of the brain were obtained. Coronal T2 and FLAIR images were obtained. 20

mL of ProHance injected and postcontrast sagittal, axial and coronal T1-weighted

postcontrast images were obtained.



Ventricular structures are midline. No midline shift is noted. There is central and

cortical atrophy noted.



No restriction of diffusion is noted. The FLAIR images demonstrates no evidence of

vasogenic edema. Susceptibility weighted images demonstrates no evidence of any residual

hemosiderin or susceptibility artifact.



Postcontrast images demonstrates no evidence of abnormally enhancing lesions.



Mastoid air cells and paranasal sinuses are otherwise unremarkable.



Coronal images demonstrates no evidence of hippocampal abnormality.



IMPRESSION: No intracranial lesion is identified.

## 2017-09-08 NOTE — EEG
ELECTROENCEPHALOGRAPHY:

 

DATE OF STUDY/DICTATION:  09/07/17 - ROOM #443

 

PATIENT OF:  Dr. Michaels.

 

CLINICAL PROBLEM:  This is a 37-year-old woman who had 2 convulsive episodes 
today lasting up to 15 seconds.  They were preceded by visual darkening and 
tunneled vision.  With the second, the  witnessed the onset where she 
became briefly limp before having her convulsion.  She was mildly confused and 
tired afterwards and she has had a prior history of syncope.

 

MEDICATIONS:  At home include, Plaquenil, methotrexate, Paxil and an 
antibiotic. She was given Keppra in the emergency room.

 

STUDY:  With the patient awake, background cerebral activity consists of 
moderate amplitude 11 Hz rhythm, which attenuates with eye opening and 
reappears with eye closure.  At times, muscle movement artifacts are noted.  No 
epileptiform potentials, focal abnormalities, or major asymmetries of 
background are noted.

 

CLINICAL IMPRESSION:  This awake EEG is within normal limits.

 

[id number]/[facility] #: [id number]

[place]

## 2017-09-08 NOTE — ECHO
Patient:      NOLAN MACHADO

Med Rec#:     [id number]            :          1980          

Date:         2017            Age:          37y                 

Account#:     [id number]          Height:       167.64 cm / 66.0 in

Accession#:   [id number]           Weight:       151.95 kg / 334.9 lbs

Sex:          F                     BSA:          2.49

Room#:        3                 

Admit Date#:  2017          

Type:         Inpatient

 

Referring:    Latasha Gerard NP

Reading:      Qutaybeh Maghaydah, MD

Sonographer:  Sarah José,JMAIECS,RDMS

CC:           Juan Salomon MD

______________________________________________________________________

 

Transthoracic Echocardiogram

 

Indication:

Syncope

BP:           128/66

HR:           97

Rhythm:       NSR

 

Findings     

History:

Smoker 

 

Technical Comments:

The study is technically limited due to poor acoustic windows. 

Completed 1115 

 

Left Ventricle:

The left ventricular chamber size is normal. The left ventricle appears

hyperdynamic. The estimated ejection fraction is 60-65%.  Normal left

ventricular diastolic filling is observed. 

 

Left Atrium:

The left atrial chamber size is normal. 

 

Right Ventricle:

The right ventricular cavity size is normal. The right ventricular

global systolic function is hyperdynamic. Flattened in diastole (D

shaped left ventricle) consistent with right ventricular volume

overload. 

 

Right Atrium:

The right atrium is not well visualized. 

 

Aortic Valve:

There is no evidence of aortic valve thickening. There is no evidence of

aortic regurgitation. There is no evidence of aortic stenosis. 

 

Mitral Valve:

The mitral valve leaflets appear normal. There is no evidence of mitral

regurgitation. There is no evidence of mitral stenosis. 

 

Tricuspid Valve:

The tricuspid valve leaflets are not thickened. There is no evidence of

tricuspid valve regurgitation. Unable to estimate the right ventricular

systolic pressure.   

 

Pulmonic Valve:

The pulmonic valve structure is not well visualized. 

 

Pericardium:

There is no significant pericardial effusion. 

 

Aorta:

The aorta is not well visualized. 

 

Pulmonary Artery:

The main pulmonary artery is not well visualized. 

 

Venous:

The inferior vena cava appears normal. There is a greater than 50%

respiratory change in the inferior vena cava dimension. 

 

Contrast:

Definity was used to optimize study.  A total of 4 ml was used 

 

Summary:

There was not any prior study for comparison. 

 

Conclusions

The left ventricular chamber size is normal.

The left ventricle appears hyperdynamic.

The estimated ejection fraction is 60-65%. 

No significan valvular disease.

 

Measurements     

Name                    Value         Normal Range            

Aortic Annulus          2 cm          (1.4 - 2.6)             

Ao root diameter (2D)   2.5 cm        (2.1 - 3.5)             

Aortic arch             2.8 cm        (1.8 - 3.4)             

LAd ISD 4CH             4.3 cm        (2.9 - 5.3)             

LA ISD 4CH W            3 cm          (2.5 - 4.5)             

 

Name                    Value         Normal Range            

LA ESV SP 4CH (A/L)     32.39 ml      -                        

LA ESV SP 2CH (A/L)     32.12 ml      -                        

LA ESV BP (A/L)         32.67 ml      -                        

LA ESV BP (A/L) index   13 ml/m2      -                        

LA ESV SP 4CH (MOD)     30.88 ml      -                        

LA ESV SP 2CH (MOD)     30.86 ml      -                        

 

Name                    Value         Normal Range            

MV E-wave Vmax          0.9 m/sec     -                        

MV deceleration time    141 msec      -                        

MV A-wave Vmax          0.7 m/sec     -                        

MV E:A ratio            1.3 ratio     -                        

LV septal e' Vmax       0.08 m/sec    -                        

LV lateral e' Vmax      0.1 m/sec     -                        

LV E:e' septal ratio    11.3 ratio    -                        

LV E:e' lateral ratio   9 ratio       -                        

 

Name                    Value         Normal Range            

AV Vmax                 1.4 m/sec     -                        

AV VTI                  24.2 cm       -                        

AV peak gradient        8 mmHg        -                        

AV mean gradient        3.9 mmHg      -                        

LVOT Vmax               1 m/sec       -                        

LVOT VTI                17.1 cm       -                        

LVOT peak gradient      4 mmHg        -                        

LVOT mean gradient      2.3 mmHg      -                        

ROCÍO Vmax                1.1 m/sec     -                        

 

Name                    Value         Normal Range            

RAP                     8 mmHg        -                        

IVC diameter            1.6 cm        -                        

 

Name                    Value         Normal Range            

PV Vmax                 0.8 m/sec     -                        

PV peak gradient        2.6 mmHg      -                        

 

Electronically signed by: Qutaybeh Maghaydah, MD on 2017 11:48:27

## 2017-09-09 NOTE — DS
CC:  Dr. Juan Salomon*

 

DISCHARGE SUMMARY:

 

DATE OF ADMISSION:  17

 

DATE OF DISCHARGE:  17

 

PRIMARY CARE PROVIDER:  Dr. Juan Salomon.

 

CONSULTING NEUROLOGIST:  Dr. Williamson.

 

DISCHARGING PROVIDER:  ALL Salamanca

 

SUPERVISION PHYSICIAN:  Arlene Batista MD * (DICTATED BY ALL SALAMANCA)

 

PRIMARY DISCHARGE DIAGNOSES:

1.  Convulsive syncope without evidence of seizures.

2.  .

 

SECONDARY DISCHARGE DIAGNOSES:

1.  Rheumatoid arthritis.

2.  Trigeminal neuralgia.

3.  Nicotine dependence.

4.  Morbid obesity with a BMI of 53.

 

DISCHARGE MEDICATIONS:

1.  Hydrocodone/acetaminophen 7.5/325 one tablet p.o. twice daily as needed.

2.  Plaquenil 400 mg p.o. daily.

3.  Methotrexate 2.5 mg weekly.

4.  Nicotine inhaler 10 mg inhaled q.2 hours as needed for nicotine craving.

5.  Nitrofurantoin 100 mg p.o. b.i.d. x7 days, started on 17.

6.  Paxil 20 mg p.o. daily.

7.  Ambien 20 mg p.o. at bedtime.

 

Medication change:  Nicotine inhaler.

 

HOSPITAL IMAGIN.  Chest x-ray shows no acute process.

2.  MRI of the brain shows no intracranial lesion or other acute changes.

3.  Transthoracic echocardiogram is essentially normal with an estimated EF of 
60% to 65% without significant valvular disease or diastolic dysfunction.

4.  EEG shows no epileptiform activity.

 

HOSPITAL COURSE:  This is a 37-year-old female with rheumatoid arthritis, 
trigeminal neuralgia, and morbid obesity, who presented after, I think, 2 
episodes with possible seizure activity.  The patient was seen in the emergency 
department on 17 with complaints of increased anxiety and shakiness after 
running out of her Paxil for 3 days.  She was given a refill of her Paxil and 2 
days later contacted by the emergency department that her urine culture grew E. 
coli sensitive to nitrofurantoin for which a prescription was sent for a 7-day 
course.  The patient states that she was actually feeling fairly well for days 
prior to her hospital stay, but experienced a syncopal episode shortly after 
exiting the shower the morning of her admission.  She felt that her vision was 
going dark.  She apparently fell to the floor where her mother found her 
foaming at the mouth and seemed to have convulsive type activity and she lost 
her bowel function.  She subsequently took a nap for about 2 hours and then 
decided to present to the emergency department for further evaluation.  She had 
a similar episode occur in the emergency department, but no loss of bowel or 
bladder function.  She does have a history of prior syncope, which she states 
is usually associated with getting overheated.

 

Initial evaluation in the emergency department demonstrated moderate 
leukocytosis with a normal differential and a total white blood cell count of 14
,600, likely due to her acute syncope or possible seizure.  Comprehensive 
metabolic panel was unremarkable.  CRP moderately elevated at 41.  Normal TSH 
and beta-hCG negative. Urinalysis unremarkable.  Lactic acid was normal at 0.9.
  Dr. Williamson, neurologist was consulted.  The patient underwent EEG, which was 
read as a negative study without evidence of epileptiform activity.  Due to her 
history of autoimmune disorder, an MRI was recommended with and without contrast
, which was read as a normal study.  Echocardiogram showed no significant 
valvular abnormalities, systolic or diastolic dysfunction.  Continuous 
telemetry monitoring identified no dysrhythmia.  The patient was asymptomatic 
throughout her hospital stay, but did experience 1 episode of shortness of 
breath.  Oxygen saturations at that time were measured in the mid to high 80s, 
but upon reevaluation, she had dark colored nail polish in place and after 
removal had normal oxygen saturations.

 

Case was reviewed with Dr. Williamson, who felt that her symptoms are likely 
consistent with the convulsive syncope and not due to an epileptic disorder.

 

DISPOSITION AND FOLLOWUP PLAN:  The patient is being discharged to home.  She 
is encouraged to quit smoking and did meet with the smoking cessation counselor 
during her hospital stay.  She will be prescribed a nicotine inhaler.  No other 
changes made to her home medications and recommend close followup with her 
primary care provider regarding this hospital stay.

 

____________________________________ ALL SALAMANCA

 

[id number]/[facility] #: [id number]

[place]

## 2017-09-09 NOTE — PN
NEUROLOGICAL FOLLOWUP:

 

DATE OF VISIT/DICTATION:  09/08/17

 

HISTORY:  A 37-year-old woman with most likely convulsive syncope.  She has had 
no further events overnight.  She has had anxiety and some panic attack with 
hyperventilation and she has also had some wheezing.

 

MEDICATIONS:  Medicines continued to be her:

1.  Paxil 20 mg daily.

2.  Macrodantin 100 b.i.d.

3.  Plaquenil 400 daily.

4.  She is also on methotrexate.

 

PHYSICAL EXAMINATION:  On exam, temperature 98.1, pulse 108, respirations 20, 
blood pressure 111/80.  She is alert and oriented with normal speech 
comprehension.  She has some mild wheezing.  Chest:  Clear.  Cardiovascular:  
Regular rate and rhythm. Abdomen:  Soft with positive bowel sounds.  Cranial 
nerves II through XII are intact.  Motor exam reveals normal tone and strength.

 

IMAGING STUDIES:  Her MRI scan was reviewed and was normal with and without 
contrast.  Her EEG was normal.  Her labs were as previously discussed.

 

Her echo was not back yet.

 

Manisha most likely had a convulsive syncope rather than a primary seizure 
disorder and does not need to be on anticonvulsants at this time.  I defer to 
the hospitalist regarding  the extent of her syncopal workup from a cardiac 
stand point of view.  I will be glad to see her back in the future as need 
arises.  She also has some issues with facial pain and if that remains 
problematic, I discussed with her I will be glad to see as an outpatient.  
Thank you for sharing her case.

 

 [id number] #: [id number]

[place]

## 2018-05-12 ENCOUNTER — HOSPITAL ENCOUNTER (EMERGENCY)
Dept: HOSPITAL 25 - ED | Age: 38
Discharge: HOME | End: 2018-05-12
Payer: COMMERCIAL

## 2018-05-12 VITALS — DIASTOLIC BLOOD PRESSURE: 79 MMHG | SYSTOLIC BLOOD PRESSURE: 119 MMHG

## 2018-05-12 DIAGNOSIS — M06.9: ICD-10-CM

## 2018-05-12 DIAGNOSIS — S39.012A: Primary | ICD-10-CM

## 2018-05-12 DIAGNOSIS — M54.5: ICD-10-CM
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
## 2018-05-12 PROCEDURE — 99283 EMERGENCY DEPT VISIT LOW MDM: CPT

## 2018-05-12 PROCEDURE — 87086 URINE CULTURE/COLONY COUNT: CPT

## 2018-05-12 PROCEDURE — 81015 MICROSCOPIC EXAM OF URINE: CPT

## 2018-05-12 PROCEDURE — 96375 TX/PRO/DX INJ NEW DRUG ADDON: CPT

## 2018-05-12 PROCEDURE — 72131 CT LUMBAR SPINE W/O DYE: CPT

## 2018-05-12 PROCEDURE — 96374 THER/PROPH/DIAG INJ IV PUSH: CPT

## 2018-05-12 PROCEDURE — 81003 URINALYSIS AUTO W/O SCOPE: CPT

## 2018-05-12 NOTE — XMS REPORT
Manisha Nash

 Created on:May 7, 2018



Patient:Manisha Nash

Sex:Female

:1980

External Reference #:[id number]





Demographics







 Address  [address]



   [place] [address]

 

 Mobile Phone  [phone]

 

 Preferred Language  English

 

 Marital Status  Not  Or 

 

 Mormonism Affiliation  Unknown

 

 Race  White

 

 Ethnic Group  Not  Or 









Author







 Organization  [facility]

 

 Address  [address] [address]

 

 Phone  [phone]









Support







 Name  Relationship  Address  Phone

 

 Bernardo Nash  Unavailable  Unavailable  [phone]









Care Team Providers







 Name  Role  Phone

 

 Juan Salomon MD  Primary Care Physician  Unavailable









Payers







 Type  Date  Identification Numbers  Payment Provider  Subscriber

 

 Commercial    Policy Number: [id number]  [employer]ri[employer] Nash









 PayID: [id number]  [address]









 [place] [address]







Problems







 Description

 

 No Information







Family History







 Date  Family Member(s)  Problem(s)  Comments

 

   General  Heart Disease  

 

   General  Asthma  

 

   General  Diabetes  

 

   General  Lupus  

 

   General  Seasonal Allergies  

 

   General  Arthritis, Osteo  

 

   General  Arthritis  

 

   General  Hypertension  







Social History







 Type  Date  Description  Comments

 

 ETOH Use    Denies alcohol use  

 

 Smoking    Light tobacco smoker (10 or fewer  1/2 ppd since age 11



     cigarettes/day)  

 

 Exercise Type/Frequency    Exercises rarely  







Allergies, Adverse Reactions, Alerts







 Date  Description  Reaction  Status  Severity  Comments

 

 2017  Clarithromycin    active    

 

 10/30/2017  Clinton Dye    active    hives







Medications







 Medication  Date  Status  Form  Strength  Qnty  SIG  Indications  Ordering



                 Provider

 

 Qsymia    Active  Caps ER  3.75-23mg  30cap  take one  R63.5      24HR    s  capsule/tab    Nina,



             let daily    M.D.



             by mouth    



             for 2 weeks    



             then 2    



             daily    



             ongoing,    



             avoid at    



             same time    



             as    



             Paroxetine    

 

 Kevzara    Active  Soln  200mg/1.1  6.84m  SQ every 2  M06.9      Prefill  4ML  l  weeks    Nina,



       Syringe          M.D.

 

 Amitriptyline HCL    Active  Tablets  10mg  60tab  take 2  M79.7          s  tablet/caps    Nina,



             ule by    M.D.



             mouth at    



             bedtime.    

 

 Gabapentin    Active  Capsules  300mg  60cap  Take One  M79.7          s  Capsule By    Nina,



             Mouth Twice    M.D.



             A Day    

 

 B12 Fast Dissolve    Active  Tablets  5000mcg  90tab  sl daily        Dispers    s      KATHERIN Wood

 

 Neoprene Patella    Active  Misc    2unit  use daily  M25.561  Bertrand



 Knee Support/Large  /2017        s  for the    Nina,



             right knee    M.DLetha



             for    



             stabilizati    



             on and to    



             prevent    



             hyperextens    



             ion    

 

 Hydrocodone-Acetam    Active  Tablets  7.5-325mg  14tab  take one  
Z79.899  Bertrand



 inophen          s  capsule/tab    Nina,



             let by    M.D.



             mouth twice    



             daily as    



             needed for    



             acute pain    

 

 Fexofenadine HCL    Active  Tablets  180mg    once daily    Unknown



                 

 

 Vitamin D3    Active  Capsules  5000Unit    1 by mouth    Unknown



             every day    

 

 Hydroxychloroquine    Active  Tablets  200mg  180ta  take two    Bertrand



 Sulfate  /0000        bs  tablets    Nina



             once daily    M.DLetha

 

 Proair HFA    Active  Aerosol  108(90Bas    2 puffs by    Unknown



   /0000      e)    mouth every    



         mcg/Act    4 hours as    



             needed    

 

 Tums E-X 750    Active  Chewtabs  750mg    prn    Unknown



   /              

 

 Paroxetine HCL    Active  Tablets  20mg    1 by mouth    Unknown



   /          every day    

 

 Tylenol Extra    Active  Tablets  500mg    2 by mouth    Unknown



 Strength  0000          as needed    

 

 Zicam Cold Remedy    Active  Tablets          Unknown



   /    Dispers          

 

 Furosemide    Active  Tablets  Unsure    1 by mouth    Unknown



   /          every day    

 

                 

 

 Orencia    Hx  Soln  125mg/ml  12ml  inject 1  M06.9      Prefill      syringe    Nina,



   -    Syringe      (125 mg)    M.D.



             under the    



             skin once a    



             week as    



             directed    



             (refrigerat    



             e)    

 

 Humira    Hx  PSKT  40mg/0.8M  6unit  inject 40  M06.9        L  s  mg    Nina,



   -          subcutaneou    M.D.



             s once    



             every other    



             week    



             prefilled    



             syringe    

 

 Etodolac    Hx  Tablets  400mg  30tab  take one            s  capsule/tab    Nina,



   -          let by    M.D.



             mouth twice    



   /2018          daily as    



             needed for    



             pain, avoid    



             with other    



             nsaids    

 

 Enbrel Sureclick  10/30  Hx  Solution  50mg/ml  3.92u  inject 50  M06.9      Auto-Inje    nits  mg    Nina,



   -    ct      subcutaneou    M.SLOANE



             sly every    



             week    

 

 Lyrica    Hx  Capsules  75mg  60cap  take one            s  capsule/tab    Nina,



   -          let by    M.DLetha



             mouth daily    



   /          for 1 week    



             then 1 by    



             mouth twice    



             daily    

 

 Methotrexate    Hx  Tablets  2.5mg    10 mg once    Unknown



   /0000          a week    



   -              



   10/30              



   /2017              

 

 Folic Acid    Hx  Tablets  1mg    1 by mouth    Unknown



   /          every day    



   -              



   10/30              



   /2017              

 

 Ondansetron HCL    Hx  Tablets  4mg    2 tablets    Unknown



   /          once daily    



   -              



   10/30              



   /2017              

 

 Paroxetine HCL    Hx  Tablets  10mg    1 by mouth    Unknown



   /0000          every day    



   -          with20 mg    



   08/17          tablet    



   /2017              

 

 Zolpidem Tartrate    Hx  Tablets  10mg    1/2 to 1    Unknown



   /0000          tab by    



   -          mouth every    



             night at    



             bedtime as    



             needed    

 

 Advil    Hx  Capsules  200mg    as needed    Unknown



   /0000              



   -              



                 

 

 Garcinia    Hx  Tablets  500-200mg    2 by mouth    Unknown



 Cambogia-Chromium  /      -mcg    twice a day    



   -              



                 







Immunizations







 CPT Code  Status  Date  Vaccine  Reaction  Lot #

 

 [id number]  Given  2018  Pneumococcal Conjugate  no immediate reaction  [id number]



       Vaccine 13 Valent For  notedt  



       Intramuscular Use    







Vital Signs







 Date  Vital  Result  Comment

 

 2018  Height  68 inches  5'8"









 Weight  348.00 lb  

 

 Heart Rate  94 /min  

 

 BP Systolic Sitting  110 mmHg  

 

 BP Diastolic Sitting  74 mmHg  

 

 Respiratory Rate  16 /min  

 

 Pain Level  4  

 

 BMI (Body Mass Index)  52.9 kg/m2  









 2018  Height  68 inches  5'8"









 Weight  353.00 lb  

 

 Heart Rate  88 /min  

 

 BP Systolic Sitting  114 mmHg  

 

 BP Diastolic Sitting  78 mmHg  

 

 Respiratory Rate  14 /min  

 

 Pain Level  8  

 

 BMI (Body Mass Index)  53.7 kg/m2  









 2018  Height  68 inches  5'8"









 Weight  347.00 lb  

 

 Heart Rate  90 /min  

 

 BP Systolic Sitting  122 mmHg  

 

 BP Diastolic Sitting  84 mmHg  

 

 Respiratory Rate  14 /min  

 

 Pain Level  7  

 

 BMI (Body Mass Index)  52.8 kg/m2  









 10/30/2017  Height  68 inches  5'8"









 Weight  336.00 lb  

 

 Heart Rate  100 /min  

 

 BP Systolic Sitting  120 mmHg  

 

 BP Diastolic Sitting  84 mmHg  

 

 Respiratory Rate  14 /min  

 

 Pain Level  7  

 

 BMI (Body Mass Index)  51.1 kg/m2  









 2017  Height  68 inches  5'8"









 Weight  335.00 lb  

 

 Heart Rate  90 /min  

 

 BP Systolic Sitting  110 mmHg  

 

 BP Diastolic Sitting  70 mmHg  

 

 Respiratory Rate  14 /min  

 

 Pain Level  8  

 

 BMI (Body Mass Index)  50.9 kg/m2  









 2017  Height  68 inches  5'8"









 Weight  340.00 lb  

 

 Heart Rate  88 /min  

 

 BP Systolic Sitting  110 mmHg  

 

 BP Diastolic Sitting  70 mmHg  

 

 Respiratory Rate  14 /min  

 

 Pain Level  5  

 

 BMI (Body Mass Index)  51.7 kg/m2  







Results







 Test  Date  Test  Result  H/L  Range  Note

 

 Laboratory test finding  2018  Magnesium  2.4 mg/dL    1.9-2.7  

 

 Vitamin D 1,25 And  2018  Vitamin D Total 25(Oh)  39.7 ng/mL    20-50  



 Vitamin D,2            









 Vitamin D, 1,25 Dihydroxy  21 pg/mL    18-78  1









 Laboratory test finding  2018  Creatine Kinase(CK)  67 U/L      

 

 Vitamin B12 And Folate Serum  2018  Vitamin B12  587 pg/mL    180-914  2









 Folic Acid (Folate)  14.58 ng/mL    &gt;3.99  









 Comp Metabolic Panel  2018  Sodium  138 mmol/L    133-145  









 Potassium  4.3 mmol/L    3.5-5.0  

 

 Chloride  104 mmol/L    101-111  

 

 Co2 Carbon Dioxide  27 mmol/L    22-32  

 

 Anion Gap  7 mmol/L    2-11  

 

 Glucose  84 mg/dL      

 

 Blood Urea Nitrogen  14 mg/dL    6-24  

 

 Creatinine  1.09 mg/dL  High  0.51-0.95  

 

 BUN/Creatinine Ratio  12.8    8-20  

 

 Calcium  9.0 mg/dL    8.6-10.3  

 

 Total Protein  6.3 g/dL  Low  6.4-8.9  

 

 Albumin  3.8 g/dL    3.2-5.2  

 

 Globulin  2.5 g/dL    2-4  

 

 Albumin/Globulin Ratio  1.5    1-3  

 

 Total Bilirubin  0.50 mg/dL    0.2-1.0  

 

 Alkaline Phosphatase  55 U/L      

 

 Alt  14 U/L    7-52  

 

 Ast  17 U/L    13-39  

 

 Egfr Non-  56.2    &gt;60  

 

 Egfr   72.2    &gt;60  3









 Laboratory test finding  2018  C Reactive Protein  12.11 mg/L  High  &lt
; 5.00  4









 TSH (Thyroid Stim Horm)  2.43 mcIU/mL    0.34-5.60  

 

 Aldolase  9.6 U/L    &lt;7.7  5









 Quantiferon Gold TB  10/23/2017  M tuberculosis by Quantiferon  Negative    
Negative  6









 TB Ag minus Nil Result  0 IU/mL      

 

 TB Mitogen minus Nil Result  &gt; 10.00 IU/mL      

 

 TB Nil Result  0.02 IU/mL      7









 Laboratory test finding  2017  Ferritin  59.6 ng/mL      8









 Vitamin D, 1,25 Dihydroxy  24 pg/mL      9









 Protein Electrophoresis  2017  Total Protein(Pep)  6.7 g/dL    6.3 - 7.9
  









 Albumin  3.4 g/dL    3.4-4.7  

 

 Alpha-1 Globulin  0.3 g/dL    0.1-0.3  

 

 Alpha-2 Globulin  1.1 g/dL    0.6-1.0  

 

 Beta Globulin  1.0 g/dL    0.7-1.2  

 

 Gamma Globulin  1.0 g/dL    0.6-1.6  

 

 Albumin/Globulin Ratio  1.03      

 

 Impression  See Comment      10









 Urinalysis Profile  2017  Urine Color  Yellow      









 Urine Appearance  Clear      

 

 Urine Specific Gravity  1.010    1.010-1.030  

 

 Urine pH  7.0    5-9  

 

 Urine Urobilinogen  Negative    Negative  

 

 Urine Ketones  Negative    Negative  

 

 Urine Protein  Negative    Negative  

 

 Urine Leukocytes  Negative    Negative  

 

 Urine Blood  Negative    Negative  

 

 Urine Nitrite  Negative    Negative  

 

 Urine Bilirubin  Negative    Negative  

 

 Urine Glucose  Negative    Negative  









 Laboratory test  2017  Magnesium  2.0 mg/dL    1.9-2.7  11



 finding            

 

 Celiac Panel  2017  Tissue Transglutaminase IgA  &lt;1.2 U/mL      12



     Ab        









 Immunoglobulin A  228 mg/dL    61 - 356  

 

 Celiac Interpretation  See Comment      13









 Celiac Hla DQ1/DQ2  2017  Hla-Dqa1  SEE BELOW      14









 Hla-DQB1  SEE BELOW      15

 

 Celiac Gene Pairs Present?  No      

 

 Celiac Gene Interpretation  See Comment      16









 Hla B27  2017  Hla B27  Negative      17









 Hla B27 Interp  See Comment      18









 Laboratory test finding  2017  Free Cortisol Serum  0.25 g/dL      19

 

 Comp Metabolic Panel  2017  Sodium  138 mmol/L    133-145  









 Potassium  4.5 mmol/L    3.5-5.0  

 

 Chloride  103 mmol/L    101-111  

 

 Co2 Carbon Dioxide  27 mmol/L    22-32  

 

 Anion Gap  8 mmol/L    2-11  

 

 Glucose  93 mg/dL      

 

 Blood Urea Nitrogen  9 mg/dL    6-24  

 

 Creatinine  1.18 mg/dL  High  0.51-0.95  

 

 BUN/Creatinine Ratio  7.6  Low  8-20  

 

 Calcium  9.1 mg/dL    8.6-10.3  

 

 Total Protein  6.4 g/dL    6.4-8.9  

 

 Albumin  3.8 g/dL    3.2-5.2  

 

 Globulin  2.6 g/dL    2-4  

 

 Albumin/Globulin Ratio  1.5    1-3  

 

 Total Bilirubin  0.60 mg/dL    0.2-1.0  

 

 Alkaline Phosphatase  66 U/L      

 

 Alt  24 U/L    7-52  

 

 Ast  29 U/L    13-39  

 

 Egfr Non-  51.3    &gt;60  

 

 Egfr   65.9    &gt;60  20









 CBC Auto Diff  2017  White Blood Count  10.8 10^3/uL    3.5-10.8  









 Red Blood Count  4.62 10^6/uL    4.0-5.4  

 

 Hemoglobin  14.5 g/dL    12.0-16.0  

 

 Hematocrit  43 %    35-47  

 

 Mean Corpuscular Volume  93 fL    80-97  

 

 Mean Corpuscular Hemoglobin  31 pg    27-31  

 

 Mean Corpuscular HGB Conc  34 g/dL    31-36  

 

 Red Cell Distribution Width  15 %    10.5-15  

 

 Platelet Count  204 10^3/uL    150-450  

 

 Mean Platelet Volume  10 um3    7.4-10.4  

 

 Abs Neutrophils  7.7 10^3/uL    1.5-7.7  

 

 Abs Lymphocytes  2.4 10^3/uL    1.0-4.8  

 

 Abs Monocytes  0.5 10^3/uL    0-0.8  

 

 Abs Eosinophils  0.1 10^3/uL    0-0.6  

 

 Abs Basophils  0.1 10^3/uL    0-0.2  

 

 Abs Nucleated RBC  0.01 10^3/uL      

 

 Granulocyte %  71.8 %    38-83  

 

 Lymphocyte %  22.4 %  Low  25-47  

 

 Monocyte %  4.5 %    1-9  

 

 Eosinophil %  0.8 %    0-6  

 

 Basophil %  0.5 %    0-2  

 

 Nucleated Red Blood Cells %  0.1      









 Cardiolipin Igg/Igm  2017  Phospholipid Ab IgM, S  &lt; 9.4 MPL      21









 Phospholipid Ab IgG  &lt; 9.4 GPL      22









 Vitamin B12 And Folate Serum  2017  Vitamin B12  227 pg/mL    180-914  23









 Folic Acid (Folate)  &gt; 20.00 ng/mL    &gt;3.99  24









 Laboratory test finding  2017  Erythrocyte Sed Rate  29 mm/Hr  High  0-
14  25









 C Reactive Protein  15.64 mg/L  High  &lt; 5.00  26

 

 Rheumatoid Factor  &lt;15 IU/mL    &lt;15  27

 

 Cyclic Citrullinated Pep Igg  &lt;15.6 U      28

 

 Creatine Kinase(CK)  80 U/L      29

 

 TSH (Thyroid Stim Horm)  2.79 mcIU/mL    0.34-5.60  30









 1  -------------------ADDITIONAL INFORMATION-------------------



   This test was developed and its performance characteristics



   determined by [facility] in a manner consistent with CLIA



   requirements. This test has not been cleared or approved by



   the U.S. Food and Drug Administration.



   Test Performed by:



   [facility]



   [address] [place] [address]

 

 2  Normal Range 180 to 914



   Indeterminate Range 145 to 180



   Deficient Range  &lt;145

 

 3  *******Because ethnic data is not always readily available,



   this report includes an eGFR for both -Americans and



   non- Americans.****



   The National Kidney Disease Education Program (NKDEP) does



   not endorse the use of the MDRD equation for patients that



   are not between the ages of 18 and 70, are pregnant, have



   extremes of body size, muscle mass, or nutritional status,



   or are non- or non-.



   According to the National Kidney Foundation, irrespective of



   diagnosis, the stage of the disease is based on the level of



   kidney function:



   Stage Description                      GFR(mL/min/1.73 m(2))



   1     Kidney damage with normal or decreased GFR       90



   2     Kidney damage with mild decrease in GFR          60-89



   3     Moderate decrease in GFR                         30-59



   4     Severe decrease in GFR                           15-29



   5     Kidney failure                       &lt;15 (or dialysis)

 

 4  Acute inflammation:  &gt;10.00

 

 5  Test Performed by:



   [place] [address]

 

 6  No interferon-gamma response to M. tuberculosis antigens



   was detected. Infection with M. tuberculosis is unlikely.



   A negative result alone does not exclude infection with



   M. tuberculosis.



   For detailed information regarding test interpretation see:



   www.[employer]/test-catalog/



   Clinical+and+Interpretive/37109

 

 7  Test Performed by:



   [facility]



   [address] [place] [address]

 

 8  Please check labs this week

 

 9  -------------------ADDITIONAL INFORMATION-------------------



   This test was developed and its performance characteristics



   determined by [facility] in a manner consistent with CLIA



   requirements. This test has not been cleared or approved by



   the U.S. Food and Drug Administration.



   Test Performed by:



   [place] [address]

 

 10  RESULT: No apparent monoclonal protein on serum electrophoresis.



   Test Performed by:



   [facility] [place] [address]

 

 11  Please check labs this week

 

 12  -------------------REFERENCE VALUE--------------------------



   &lt;4.0 (Negative)



   Test Performed by:



   [place] [address]

 

 13  Negative serology. Celiac disease unlikely. However,



   approximately 10% of patients with celiac disease are



   seronegative. Also, patients who are already adhering to a



   gluten-free diet may be seronegative. If celiac disease is



   highly clinically suspected, consider HLA-DQ typing.



   Test Performed by:



   [place] [address]

 

 14  RESULT: ,01:02



   -------------------REFERENCE VALUE--------------------------



   Not Applicable

 

 15  RESULT: 05:01,06:02



   DQ Serologic Equivalent:



   5,6



   -------------------REFERENCE VALUE--------------------------



   Not Applicable

 

 16  The absence of HLA celiac permissive genes would make the



   presence of celiac disease unlikely.



   -------------------ADDITIONAL INFORMATION-------------------



   Method: Molecular typing of HLA antigens performed using



   reverse SSOP and/or SSP methods, reported as serological



   equivalents and low to medium resolution molecular values.



   Performing Laboratory CLIA# [id number]



   Test Performed by:



   [facility] - [address] [address]

 

 17  -------------------REFERENCE VALUE--------------------------



   Not Applicable

 

 18  RESULT: HLA-B27 antigen was not detected.



   -------------------ADDITIONAL INFORMATION-------------------



   Method: Flow Cytometry



   Performing Laboratory CLIA# [id number]



   Test Performed by:



   [place] [address]

 

 19  Adult Reference Ranges for Cortisol, Free,



   LC/MS/MS:



   8:00 - 10:00 AM      0.07-0.93 mcg/dL



   4:00 -  6:00 PM      0.04-0.45 mcg/dL



   10:00 - 11:00 PM      0.04-0.35 mcg/dL



   This test was developed and its analytical performance



   characteristics have been determined by [facility]. It has not been



   cleared or approved by FDA. This assay has been validated



   pursuant to the CLIA regulations and is used for clinical



   purposes.



   Test Performed by:



   [facility]



   [address] [facility] [place] [address]

 

 20  *******Because ethnic data is not always readily available,



   this report includes an eGFR for both -Americans and



   non- Americans.****



   The National Kidney Disease Education Program (NKDEP) does



   not endorse the use of the MDRD equation for patients that



   are not between the ages of 18 and 70, are pregnant, have



   extremes of body size, muscle mass, or nutritional status,



   or are non- or non-.



   According to the National Kidney Foundation, irrespective of



   diagnosis, the stage of the disease is based on the level of



   kidney function:



   Stage Description                      GFR(mL/min/1.73 m(2))



   1     Kidney damage with normal or decreased GFR       90



   2     Kidney damage with mild decrease in GFR          60-89



   3     Moderate decrease in GFR                         30-59



   4     Severe decrease in GFR                           15-29



   5     Kidney failure                       &lt;15 (or dialysis)

 

 21  -------------------REFERENCE VALUE--------------------------



   &lt;15.0 (Negative)

 

 22  -------------------REFERENCE VALUE--------------------------



   &lt;15.0 (Negative)



   Test Performed by:



   [address] [address]

 

 23  Normal Range 180 to 914



   Indeterminate Range 145 to 180



   Deficient Range  &lt;145

 

 24  Please check labs this week

 

 25  Please check labs this week

 

 26  Acute inflammation:  &gt;10.00

 

 27  Test Performed by:



   [address] [address]

 

 28  -------------------REFERENCE VALUE--------------------------



   &lt;20.0 (Negative)



   Test Performed by:



   [facility]



   [address] [place] [address]

 

 29  Please check labs this week

 

 30  Please check labs this week







Procedures







 Date  CPT Code  Description  Status

 

 2017  44065  ECHO Transthorasic Realtime 2D W Doppler &amp; Color  
Completed



     Flow Hosp  

 

 2017  14430  EEG Recording Awake &amp; Drowsy  Completed







Encounters







 Type  Date  Location  Provider  CPT E/M  Dx

 

 Office Visit  2018  [facility]  Bertrand Wood M.D.  [id number]  
M06.9



   10:40a  Of Ismael      









 M79.7

 

 R79.82

 

 R20.8

 

 Z79.899









 Office Visit  2018  9:20a  [facility]  Bertrand Wood  
[address]  M06.9



     Ismael PANDEY    









 M79.7

 

 R79.82

 

 R20.8

 

 Z79.899

 

 Z23









 Office Visit  10/30/2017  3:00p  [facility]  Bertrand Wood  
[address]  M06.9



     Ismael PANDEY    









 M79.7

 

 R79.82

 

 M35.9

 

 Z79.899









 Office Visit  2017  4:43p  [facility]  Nasim Williamson MD  
[address]  R55

 

 Office Visit  2017 12:53p  [facility],  Raj  [address]  R55



     Hospitalists  [place]    









 M06.9









 Office Visit  2017  4:41p  [facility]  Nasim Williamson MD  
[address]  R55

 

 Office Visit  2017 12:53p  [facility]  Latasha Gerard N.P.  
[address]  R55



     Hospitalists      









 M06.9









 Office Visit  2017  4:40p  [facility]  Bertrand Wood  
[address]  M79.7



     Ismael PANDEY    









 M35.9

 

 M54.5

 

 R79.82

 

 Z79.899









 Office Visit  2017  1:00p  [facility]  Bertrand Wood  [address]  
R79.82



     Of Ismael PANDEY    









 M79.7

 

 M35.9

 

 Z79.899

 

 R53.83

 

 F17.210







Plan of Care

Future Appointment(s):2018  8:20 am - Bertrand Wood M.D. at [facility]2018 - Bertrand Wood M.D.M06.9 Rheumatoid arthritis, 
unspecifiedComments:1. Don??t smoke any cigarettes. Each cigarette you smoke 
damages your lungs, your blood vessels, andcells throughout your body. Even 
occasional smoking is harmful.2. Write down why you want to quit. Do you want to
??Be around for your loved ones?Have better health?Set a good example for your 
children?Protect your family from breathing other people??s smoke?Really 
wanting to quit smoking is very important to how much success you will have in 
quitting.3. Know that it will take commitment and effort toquit smoking. Nearly 
all smokers have some feelings of nicotine withdrawal when they try to quit. 
Nicotine is addictive.a Knowing this will help you deal with withdrawal 
symptoms that can occur, such as bad moods and really wanting to smoke.Your 
chances of quitting are better if you don't do it aloneThere are many ways 
smokers quit, including using nicotine replacement products (gum and patches) 
or FDA-approved, non-nicotine cessation medications. Some people do not 
experience any withdrawal symptoms. For most people, symptoms only last a few 
days to a couple of weeks.a Take quitting one day at a time, even one minute at 
a time??whatever you need to succeed.4. Get help if you want it. Smokers can 
receive free resources and assistance to help them quit by calling the 8-636-
FVDB-NOW quitline (1-919.195.4097) or by visiting [facility]??s Tips From Former 
Smokers??. Your health care providers are also a good source for help and 
support.Concerned about weight gain? It??s a common concern, but not everyone 
gains weight when they stop smoking.b Learn ways to help you control your 
weight as you quit smoking.5. Remember this good news! More than half of all 
adult smokers have quit, and you can, too.c Millions of people have learned to 
face life without a cigarette. Quitting smoking is the single most important 
step you can take to protect your health and the health of your family.M79.7 
[id number] Elevated C-reactive protein (CRP)G47.00 Insomnia, 
[id number] Syncope and [id number] Other long term (current) drug 
ssycajsC60.5 Abnormal weight gainNew Medication:Qsymia 3.75-23 mgFollow up:
Follow up in 2 months or sooner if needed

## 2018-05-12 NOTE — RAD
Indication: History of rheumatoid arthritis with numbness and pain in the right foot.



CT of the lumbar spine was obtained in the axial plane. Sagittal and coronal reconstructed

images were obtained.



The vertebral bodies appear normal in height. No evidence of fracture is noted. Evaluation

of the thecal sac and spinal canal is limited due to body habitus.



L5-S1 there is degenerative disc disease. Facet arthropathy is noted bilaterally.



At L3-L4 degenerative disc disease and spondylitic ridge flattens the thecal sac.



The pedicles appear intact. Transverse processes are unremarkable.



IMPRESSION: The study is markedly limited due to body habitus.



There is suggestion of degenerative disc disease at L5-S1 with facet arthropathy. There is

also spondylitic ridge at L3-L4 flattening the thecal sac.

## 2018-08-15 NOTE — ED
Gorge KOLB Elizabeth, scribed for Barbara Smtih MD on 18 at 1947 .





Back Pain





- HPI Summary


HPI Summary: 


This patient is a 38 year old F presenting to [facility] via EMS with a chief 

complaint of severe lower back pain since 1 day ago. Patient reports that she 

fell 1 day ago, twisted her foot and hurt her back. Patient reports that she 

took her last dose of hydrocodone last night. She reports that the pain is 

worse since this evening. The patient rates the pain 8/10 in severity. Symptoms 

aggravated by nothing. Symptoms alleviated by nothing. Patient reports nausea, 

temporary inability to ambulate, fire-like pain in hips and lower extremities 

followed by numbness in both legs. Patient reports that she has since regained 

sensation in her left leg but her right leg is still numb. Patient denies 

incontinence, inability to void, fever, or abdominal pain. LNMP was 2 weeks 

ago. 








- History of Current Complaint


Chief Complaint: EDBackInjuryPain


Stated Complaint: BACK PAIN


Time Seen by Provider: 18 18:33


Hx Obtained From: Patient


Onset/Duration: Sudden Onset, Still Present, Worse Since - this evening


Onset/Duration: Started Days Ago - 1 day ago, Traumatic - fell 1 day ago, Still 

Present, Worse Since - this evening


Timing: Constant


Back Pain Location: Is Discrete @ - low back, Radiates To - hips, bilat lower 

extremities


Severity Initially: Mild


Severity Currently: Moderate


Pain Intensity: 8


Pain Scale Used: 0-10 Numeric


Character: Aching


Aggravating Symptom(s): Nothing


Alleviating Symptom(s): Nothing


Associated Signs And Symptoms: Positive: Numbness - in right leg.  Negative: 

Fever, Abdominal Pain, Bladder Incontinence, Bowel Incontinence





- Allergies/Home Medications


Allergies/Adverse Reactions: 


 Allergies











Allergy/AdvReac Type Severity Reaction Status Date / Time


 


clarithromycin Allergy  Difficulty Verified 18 18:39





   Breathing  











Home Medications: 


 Home Medications





Albuterol HFA INHALER* [Ventolin HFA Inhaler*] 2 puff INH Q4H PRN 18 [

History Confirmed 18]


Amitriptyline TAB* [Elavil TAB*] 1 tab PO BEDTIME 18 [History Confirmed ]


Biotin 1 tab PO DAILY 18 [History Confirmed 18]


Cholecalciferol (Vitamin D3) [Vitamin D3] 1 tab PO DAILY 18 [History 

Confirmed 18]


Fexofenadine (NF) [Allegra 180 (NF)] 1 tab PO DAILY PRN 18 [History 

Confirmed 18]


Furosemide TAB* [Lasix TAB*] 40 mg PO DAILY 18 [History Confirmed 18

]


Gabapentin [Gabapentin] 300 mg PO BID 18 [History Confirmed 18]











PMH/Surg Hx/FS Hx/Imm Hx


Previously Healthy: No


Endocrine/Hematology History: 


   Denies: Hx Diabetes


Cardiovascular History: Reports: Hx Syncope


   Denies: Hx Congestive Heart Failure, Hx Hypertension, Hx Pacemaker/ICD


Respiratory History: Reports: Hx Chronic Bronchitis


GI History: Reports: Hx Gastroesophageal Reflux Disease, Hx Ulcer


 History: Reports: Hx Kidney Stones


   Denies: Hx Renal Disease


Musculoskeletal History: Reports: Hx Rheumatoid Arthritis, Hx Back Problems, Hx 

Fibromyalgia


Sensory History: 


   Denies: Hx Contacts or Glasses, Hx Hearing Aid


Opthamlomology History: 


   Denies: Hx Contacts or Glasses


Neurological History: Reports: Hx Headaches, Hx Migraine, Hx Nerve Disease


Psychiatric History: Reports: Hx Anxiety, Hx Depression, Hx Panic Disorder





- Surgical History


Surgery Procedure, Year, and Place: CHOLECYSTECTOMY, , TUBAL LIGATION


Hx Anesthesia Reactions: No


Infectious Disease History: No


Infectious Disease History: 


   Denies: Traveled Outside the [place] in Last 30 Days





- Family History


Known Family History: Positive: Hypertension, Diabetes





- Social History


Alcohol Use: Rare


Hx Substance Use: No


Substance Use Type: Reports: None


Substance Use Comment - Amount & Last Used: Vicodin


Hx Tobacco Use: Yes


Smoking Status (MU): Light Every Day Tobacco Smoker





Review of Systems


Negative: Fever


Cardiovascular: Negative


Respiratory: Negative


Positive: Nausea.  Negative: Abdominal Pain


Negative: incontinence


Positive: Other - lower back pain, temporarily unable to ambulate


Skin: Negative


Positive: Numbness - in right leg, temporarily in both legs


Psychological: Normal


All Other Systems Reviewed And Are Negative: Yes





Physical Exam





- Summary


Physical Exam Summary: 


Appearance: Well-appearing, moderate to severe pain distress, Obese


Skin: Warm, color reflects adequate perfusion, no bruising or abrasions 


Head: Normal Head/Face inspection, Atraumatic


Eyes: Conjunctiva clear


ENT: Normal inspection


Neck: Supple, no nodes, no JVD, no spinal tenderness 


Respiratory: Lungs clear, Normal breath sounds, no respiratory distress


Cardio: RRR, No murmur, pulses normal, brisk capillary refill


Abdomen: soft, nontender, no masses


Bowel sounds: present 


Musculoskeletal: Strength Intact/ ROM intact. No calf tenderness. No edema. 

Lumbar spinal and bilateral paraspinal tenderness


Psychological: Normal


Neuro: Alert, muscle tone normal, no focal deficit, sensation intact, moves all 

extremities well





Triage Information Reviewed: Yes


Vital Signs On Initial Exam: 


 Initial Vitals











Temp Pulse Resp BP Pulse Ox


 


 98.0 F   104   18   140/86   95 


 


 18 18:35  18 18:35  18 18:35  18 18:35  18 18:35











Vital Signs Reviewed: Yes





Diagnostics





- Vital Signs


 Vital Signs











  Temp Pulse Resp BP Pulse Ox


 


 18 19:00   98    95


 


 18 18:52   104   136/110  98


 


 18 18:38   97    97


 


 18 18:35  98.0 F  104  18  140/86  95














- Laboratory


Lab Statement: Any lab studies that have been ordered have been reviewed, and 

results considered in the medical decision making process.





- CT


  ** Lumbar Spine CT


CT Interpretation: Positive (See Comments) - IMPRESSION: The study is markedly 

limited due to body habitus.  There is suggestion of degenerative disc disease 

at L5-S1 with facet arthropathy. There is also spondylitic ridge at L3-L4 

flattening the thecal sac. Dr. Smith has reviewed this report.


CT Interpretation Completed By: Radiologist





Re-Evaluation





- Re-Evaluation


  ** first re-evaluation


Re-Evaluation Time: 21:40


Change: Improved


Comment: Patient reports the Dilaudid relieved her pain to a 1-2/10. Patient 

expresses a desire to be discharged now that her pain is controlled.





Back Pain Course/Dx





- Course


Course Of Treatment: CT Lumbar Spine reveals, per radiologist, there is 

suggestion of degenerative disc disease at L5-S1 with facet arthropathy. There 

is also spondylitic ridge at L3-L4 flattening the thecal sac. Dr. Smith has 

reviewed this radiology report. In the ED course the patient was given IV 

Reglan and IV Dilaudid.  Patient was re-evaluated at 21:40, at which time she 

reported her pain relieved to a 1-2/10 from the Dilaudid. She expresses a 

desire to be discharged now that her pain is managed. Patient is diagnosed with 

lumbosacral strain, lower back pain, and rheumatoid arthritis. Patient will be 

discharged home with prescription for Soma and Dilaudid.





- Diagnoses


Provider Diagnoses: 


 Lumbosacral strain, Lower back pain, Rheumatoid arthritis








Discharge





- Sign-Out/Discharge


Documenting (check all that apply): Discharge/Admit/Transfer





- Discharge Plan


Condition: Stable


Disposition: HOME


Prescriptions: 


Carisoprodol TAB* [Soma  TAB*] 350 mg PO Q6H PRN #20 tab MDD 4


 PRN Reason: Severe Pain


HYDROmorphone TAB* [Dilaudid TAB*] 2 mg PO Q4H PRN #18 tab MDD 6


 PRN Reason: Severe Pain


Ondansetron ODT TAB* [Zofran 4 MG Odt TAB*] 4 mg PO Q8H PRN #20 tab.odt


 PRN Reason: Nausea


Patient Education Materials:  Low Back Strain (ED), Acute Low Back Pain (ED)


Referrals: 


Aime ARIAS,Juan EUBANKS [Primary Care Provider] - 2 Days


Bertrand Wood MD [Medical Doctor] - As Soon As Possible


Additional Instructions: 


We gave you dilaudid 2mg IV and soma 350mg for your pain.  We gave you reglan 

10mg for nausea. We also gave you a copy of your CT. 


Return to the ER if you have any new or worsening symptoms.  





- Billing Disposition and Condition


Condition: STABLE


Disposition: HOME





The documentation as recorded by the Gorge rowland Elizabeth accurately 

reflects the service I personally performed and the decisions made by me, Barbara Smith MD. alert

## 2019-02-21 ENCOUNTER — HOSPITAL ENCOUNTER (EMERGENCY)
Dept: HOSPITAL 25 - ED | Age: 39
Discharge: HOME | End: 2019-02-21
Payer: COMMERCIAL

## 2019-02-21 VITALS — SYSTOLIC BLOOD PRESSURE: 144 MMHG | DIASTOLIC BLOOD PRESSURE: 79 MMHG

## 2019-02-21 DIAGNOSIS — Z88.1: ICD-10-CM

## 2019-02-21 DIAGNOSIS — Z88.8: ICD-10-CM

## 2019-02-21 DIAGNOSIS — J10.1: Primary | ICD-10-CM

## 2019-02-21 DIAGNOSIS — B96.20: ICD-10-CM

## 2019-02-21 DIAGNOSIS — Z91.048: ICD-10-CM

## 2019-02-21 DIAGNOSIS — Z91.030: ICD-10-CM

## 2019-02-21 DIAGNOSIS — F17.200: ICD-10-CM

## 2019-02-21 DIAGNOSIS — N39.0: ICD-10-CM

## 2019-02-21 LAB
ALBUMIN SERPL BCG-MCNC: 4.2 G/DL (ref 3.2–5.2)
ALBUMIN/GLOB SERPL: 1.4 {RATIO} (ref 1–3)
ALP SERPL-CCNC: 71 U/L (ref 34–104)
ALT SERPL W P-5'-P-CCNC: 22 U/L (ref 7–52)
ANION GAP SERPL CALC-SCNC: 8 MMOL/L (ref 2–11)
APTT PPP: 21.2 SECONDS (ref 26–36.3)
AST SERPL-CCNC: (no result) U/L (ref 13–39)
BASOPHILS # BLD AUTO: 0.1 10^3/UL (ref 0–0.2)
BUN SERPL-MCNC: 6 MG/DL (ref 6–24)
BUN/CREAT SERPL: 6.4 (ref 8–20)
CALCIUM SERPL-MCNC: 8.9 MG/DL (ref 8.6–10.3)
CHLORIDE SERPL-SCNC: 107 MMOL/L (ref 101–111)
EOSINOPHIL # BLD AUTO: 0 10^3/UL (ref 0–0.6)
FLUAV RNA SPEC QL NAA+PROBE: POSITIVE
GLOBULIN SER CALC-MCNC: 3.1 G/DL (ref 2–4)
GLUCOSE SERPL-MCNC: 83 MG/DL (ref 70–100)
HCO3 SERPL-SCNC: 23 MMOL/L (ref 22–32)
HCT VFR BLD AUTO: 42 % (ref 35–47)
HGB BLD-MCNC: 13.8 G/DL (ref 12–16)
INR PPP/BLD: 1.02 (ref 0.77–1.02)
LYMPHOCYTES # BLD AUTO: 1.8 10^3/UL (ref 1–4.8)
MCH RBC QN AUTO: 28 PG (ref 27–31)
MCHC RBC AUTO-ENTMCNC: 33 G/DL (ref 31–36)
MCV RBC AUTO: 86 FL (ref 80–97)
MONOCYTES # BLD AUTO: 0.6 10^3/UL (ref 0–0.8)
NEUTROPHILS # BLD AUTO: 9 10^3/UL (ref 1.5–7.7)
NRBC # BLD AUTO: 0 10^3/UL
NRBC BLD QL AUTO: 0
PLATELET # BLD AUTO: 135 10^3/UL (ref 150–450)
POTASSIUM SERPL-SCNC: (no result) MMOL/L (ref 3.5–5)
PROT SERPL-MCNC: 7.3 G/DL (ref 6.4–8.9)
RBC # BLD AUTO: 4.88 10^6/UL (ref 4–5.4)
RBC UR QL AUTO: (no result)
SODIUM SERPL-SCNC: 138 MMOL/L (ref 135–145)
TROPONIN I SERPL-MCNC: 0 NG/ML (ref ?–0.04)
WBC # BLD AUTO: 11.5 10^3/UL (ref 3.5–10.8)
WBC UR QL AUTO: (no result)

## 2019-02-21 PROCEDURE — 87086 URINE CULTURE/COLONY COUNT: CPT

## 2019-02-21 PROCEDURE — 87040 BLOOD CULTURE FOR BACTERIA: CPT

## 2019-02-21 PROCEDURE — 81003 URINALYSIS AUTO W/O SCOPE: CPT

## 2019-02-21 PROCEDURE — 36415 COLL VENOUS BLD VENIPUNCTURE: CPT

## 2019-02-21 PROCEDURE — 84484 ASSAY OF TROPONIN QUANT: CPT

## 2019-02-21 PROCEDURE — 96375 TX/PRO/DX INJ NEW DRUG ADDON: CPT

## 2019-02-21 PROCEDURE — 85730 THROMBOPLASTIN TIME PARTIAL: CPT

## 2019-02-21 PROCEDURE — 85610 PROTHROMBIN TIME: CPT

## 2019-02-21 PROCEDURE — 96361 HYDRATE IV INFUSION ADD-ON: CPT

## 2019-02-21 PROCEDURE — 99283 EMERGENCY DEPT VISIT LOW MDM: CPT

## 2019-02-21 PROCEDURE — 87186 SC STD MICRODIL/AGAR DIL: CPT

## 2019-02-21 PROCEDURE — 80053 COMPREHEN METABOLIC PANEL: CPT

## 2019-02-21 PROCEDURE — 81015 MICROSCOPIC EXAM OF URINE: CPT

## 2019-02-21 PROCEDURE — 83605 ASSAY OF LACTIC ACID: CPT

## 2019-02-21 PROCEDURE — 96374 THER/PROPH/DIAG INJ IV PUSH: CPT

## 2019-02-21 PROCEDURE — 85025 COMPLETE CBC W/AUTO DIFF WBC: CPT

## 2019-02-21 PROCEDURE — 71045 X-RAY EXAM CHEST 1 VIEW: CPT

## 2019-02-21 PROCEDURE — 87077 CULTURE AEROBIC IDENTIFY: CPT

## 2019-02-21 NOTE — XMS REPORT
Continuity of Care Document (CCD)

 Created on:2019



Patient:Manisha Nash

Sex:Female

:1980

External Reference #:[id number]





Demographics







 Address  [address]



   [place] [address]

 

 Mobile Phone  [phone]

 

 Preferred Language  en

 

 Marital Status  Not  or 

 

 Denominational Affiliation  Unknown

 

 Race  White

 

 Ethnic Group  Not  or 









Author







 Name  HoraceMari gomez









Support







 Name  Relationship  Address  Phone

 

 Bernardo Nash  Unavailable  Unavailable  [phone]









Care Team Providers







 Name  Role  Phone

 

 Juan Salomon MD  Primary Care Physician  Unavailable









Payers







 Type  Date  Identification Numbers  Payment Provider  Subscriber

 

     Policy Number: [id number]  Pollo Nash









 PayID: [id number]  [address]









 [place] [address]







Advance Directives







 Description

 

 No Information Available







Problems







 Date  Description  Provider  Status

 

 Onset: 2018  Obstructive sleep apnea  Elisa Cronin DNP, RN,  Active



   syndrome  FNP-BC  

 

 Onset: 2018  Tobacco user  Elisa Cronin DNP, RN,  Active



     FNP-BC  

 

 Onset: 2018  Body mass index 40+ - severely  Elisa Cronin DNP, RN,  
Active



   obese  FNP-BC  







Family History







 Date  Family Member(s)  Problem(s)  Comments

 

   General  Heart Disease  

 

   General  Asthma  

 

   General  Diabetes  

 

   General  Lupus  

 

   General  Seasonal Allergies  

 

   General  Arthritis, Osteo  

 

   General  Arthritis  

 

   General  Hypertension  

 

   General  Sleep Apnea  2 maternal uncles

 

   General  Insomnia  Maternal grandmother

 

   Father  Diabetes Type I  

 

   Father  Hypertension  

 

   Mother  Diabetes Type I  

 

   Mother  Hypertension  

 

   First Brother  Sleep Apnea  







Social History







 Type  Date  Description  Comments

 

 Birth Sex    Unknown  

 

 Marital Status      

 

 Lives With      

 

 Lives With    Children  3

 

 Occupation    Disabled  Applying for



       disability 18

 

 Tobacco Use  Start: Unknown  currently smokes 1/2  10-15 per day. 25



     Pack Daily  years. 2018

 

 ETOH Use    Denies alcohol use  

 

 Recreational Drug Use    Denies Drug Use  

 

 Tobacco Use  Start: Unknown  Heavy tobacco smoker  10-15



     (more than 10  



     cigarettes/day)  

 

 Smoking Status  Reviewed: 19  Heavy tobacco smoker  10-15



     (more than 10  



     cigarettes/day)  

 

 Exercise Type/Frequency    Exercises regularly  Chases children

 

 Exercise Type/Frequency    Elliptical  2-3x per week







Allergies, Adverse Reactions, Alerts







 Date  Description  Reaction  Status  Severity  Comments

 

 2017  Clarithromycin  Bruising, Skin blotches, hives,  Active    



     SOB      

 

 10/30/2017  Davie Dye    Active    hives

 

 2018  Spider Bites  Target rash  Active    

 

 2018  Bee Sting  Anaphylaxis  Active    

 

 2018  Kevzara  Nausea and Vomiting, Breathing,  Active    Biologic



     chest heaviness, Chest, ear dulce,      



     site react      

 

 2018  Azithromycin  Skin blotches, hives, SOB  Active    

 

 2018  Cephalexin  Anaphylaxis  Active    







Medications







 Medication  Date  Status  Form  Strength  Qnty  SIG  Indications  Ordering



                 Provider

 

 Paroxetine HCL  /  Active  Tablets  20mg  30tabs  Take One    Bertrand



   2019          Tablet By    Nina,



             Mouth Every    M.D.



             Day; Stop    



             Cymbalta    

 

 Trazodone HCL  /  Active  Tablets  50mg  30tabs  take one    Bertrand



             tablet/capsu    millicent Wood by mouth    M.D.



             at bedtime.    



             as needed    



             for insomnia    

 

 Pantoprazole  /  Active  Tablets  40mg  30tabs  Take One    Bertrand



 Sodium      DR      Tablet By    Nina,



             Mouth Every    M.D.



             Day    

 

 Cane/Adjustabl  /  Active  Misc  "  1units  use daily to  M06.9  Bertrand



 e/Aluminum/Rou            help with    nan Wood Handle "            ambulation    M.D.



             with a 4    



             point prong    

 

 Morphine  /  Active  Tablets  15mg  30tabs  Take One  G89.4  Bertrand



 Sulfate ER  2018    ER      Tablet By    Nina,



             Mouth AT    M.D.



             Bedtime;    



             Maximum    



             Daily Dose=1    









 M06.9









 Hydrocodone-Acetaminophen  2018  Active  Tablets  10-325mg  60tabs  Take 
One  M06.9  Bertrand



             Tablet    Nina,



             By Mouth    M.D.



             Twice A    



             Day as    



             Needed    



             For    



             Pain;    



             Maximum    



             Daily    



             Dose=2    

 

 Alprazolam  2018  Active  Tablets  0.25mg  14tabs  one tab    Bertrand



             by mouth    Nina,



             as    M.D.



             needed    



             for    



             panic    



             attacks    

 

 Gabapentin  2017  Active  Capsules  300mg  60caps  Take One  M79.7  
Bertrand



             Capsule    Nina,



             By Mouth    M.D.



             Twice A    



             Day    

 

 Neoprene Patella Knee  2017  Active  Misc    2units  use  M25.56  Bertrand



 Support/Large            daily  1  Nina,



             for the    M.D.



             right    



             knee for    



             stabiliz    



             ation    



             and to    



             prevent    



             hyperext    



             ension    

 

 Fexofenadine HCL    Active  Tablets  180mg    once    Unknown



             daily    

 

 Vitamin D3    Active  Capsules  5000Unit    1 by    Unknown



             mouth    



             every    



             day    

 

 Hydroxychloroquine Sulfate    Active  Tablets  200mg  180tabs  take 
two    Bertrand Wood,



             once    M.D.



             daily    

 

 Proair HFA    Active  Aerosol  108(90Bas    2 puffs    Unknown



         e)    by mouth    



         mcg/Act    every 4    



             hours as    



             needed    

 

 Tums E-X 750    Active  Chewtabs  750mg    4 to 6    Unknown



             by mouth    



             as    



             needed    

 

 Zicam Cold Remedy    Active  Tablets      as    Unknown



       Dispers      needed    

 

 Epipen 2-[place]    Active  Solution  0.3mg/0.3    use as    Unknown



       Auto-Inj  ML    directed    



       ect          

 

 Ondansetron    Active  Tablets  4mg    Take 1    Unknown



       Dispers      Tablet    



             By Mouth    



             Every 8    



             Hours as    



             Needed    



             For    



             Nausea    

 

                 

 

 Paxil  2018  Hx  Tablets  20mg  30tabs  Take One    Bertrand



   -          Tablet    Nina,



   2019          By Mouth    M.D.



             Every    



             Day;    



             Stop    



             Cymbalta    

 

 Simponi Aria  2018  Hx  Solution  50mg/4ML    2 mg/kg    Bertrand Wood,



   2019          infusion    M.D.



             at week    



             0, week    



             4 and    



             then    



             every 8    



             weeks    

 

 Protonix  2018  Hx  Tablets  40mg  30tabs  Take One    Bertrand Wood,



   2018          By Mouth    M.D.



             Every    



             Day    

 

 Butrans  2018  Hx  Patches  10mcg/HR  4units  topical  M06.9  Bertrand



   -    Weekly      every 7    Nina,



   10/03/2018          days    M.D.









 G89.4









 Nucynta  08/15/2018 -  Hx  Tablets  50mg  14tabs  1 by mouth  M06.9  Bertrand Wood,



   2018          twice daily as    M.D.



             needed for    



             pain    









 G89.4









 Duloxetine HCL  2018 -  Hx  Caps   60mg  30caps  1 by mouth every    
Bertrand



   2018    Part      day    KATHERIN Wood

 

 Xeljanz  2018 -  Hx  Tablets  5mg  60tabs  1 by mouth twice  M0  Bertrand



   2018          a day  6.  Nina,



               9  M.D.

 

 Cymbalta  2018 -  Hx  Caps   30mg  42caps  1 by mouth every    Mendon



   2018    Part      day for 1 week    Nina,



             then 2 daily    M.D.



             ongoing, taper    



             off of    



             Amitryptyline    



             and Paxil    

 

 Simponi  2018 -  Hx  Solution  50mg/0.5  1.5units  inject 0.5  M0  Mendon



   2018    Auto-Injec  ML    milliliters  6.  Nina,



       t      subcutaneously  9  M.D.



             every month,    



             please give a 90    



             day supply, stop    



             Kevzara(not    



             taking yet d/t    



             ins 18)    

 

 Prednisone  2018 -  Hx  Tablets  10mg  30tabs  take 4 tabs by    Mendon



   2018          mouth daily for    Nina,



             2 days then 3    M.D.



             tabs daily for 2    



             days then 2 tabs    



             for 2 days then    



             1 tab for 2 days    



             then d/c    

 

 Qsymia  2018 -  Hx  Caps ER  3.75-23m  30caps  take one  R6  Mendon



   2018    24HR  g    capsule/tablet  3.  Nina,



             daily by mouth  5  M.D.



             for 2 weeks then    



             2 daily ongoing,    



             avoid at same    



             time as    



             Paroxetine    

 

 Xenical  2018 -  Hx  Capsules  120mg  90caps  take one  R6  Mendon



   2018          capsule/tab  3.  Nina,



             every 8 hours  5  M.D.



             (total of 3 per    



             day) with meals    



             that contain fat    

 

 Kevzara  2018 -  Hx  Soln  200mg/1.  6.84ml  SQ every 2 weeks  M0  Mendon



   2018    Prefill  14ML      6.  Nina,



       Syringe        9  M.D.

 

 Orencia  2018 -  Hx  Soln  125mg/ml  12ml  inject 1 syringe  M0  Mendon



   2018    Prefill      (125 mg) under  6.  Nina,



       Syringe      the skin once a  9  M.D.



             week as directed    



             (refrigerate)    

 

 Humira  2018 -  Hx  PSKT  40mg/0.8  6units  inject 40 mg  M0  Mendon



   2018      ML    subcutaneous  6.  Nina,



             once every other  9  M.D.



             week prefilled    



             syringe    

 

 Amitriptyline  2018 -  Hx  Tablets  10mg  60tabs  take 2  M7  Bertrand



 [facility]  2018          tablet/capsule  9.  Nina,



             by mouth at  7  M.D.



             bedtime.    

 

 Etodolac  2017 -  Hx  Tablets  400mg  30tabs  take one    Bertrand



   2018          capsule/tablet    Nina,



             by mouth twice    M.D.



             daily as needed    



             for pain, avoid    



             with other    



             nsaids    

 

 Enbrel  10/30/2017 -  Hx  Solution  50mg/ml  3.92unit  inject 50 mg  M0  Bertrand



 Yanira  2018    Auto-Injec    s  subcutaneously  6.  Nina,



       t      every week  9  M.D.

 

 B12 Fast  2017 -  Hx  Tablets  5000mcg  90tabs  sl daily    Bertrand



 Dissolve  2019    Dispers          Nina,



                 M.D.

 

 Hydrocodone-Ace  2017 -  Hx  Tablets  7.5-325m  30tabs  take one  BRADY7  
Bertrand



 taminophen  08/15/2018      g    capsule/tablet  9.  Nina,



             by mouth twice  89  M.D.



             daily as needed  9  



             for acute pain    

 

 Lyrica  2017 -  Hx  Capsules  75mg  60caps  take one    Bertrand



   2017          capsule/tablet    Nina,



             by mouth daily    M.D.



             for 1 week then    



             1 by mouth twice    



             daily    

 

 Methotrexate   -  Hx  Tablets  2.5mg    10 mg once a    Unknown



   10/30/2017          week    

 

 Folic Acid   -  Hx  Tablets  1mg    1 by mouth every    Unknown



   10/30/2017          day    

 

 Ondansetron HCL   -  Hx  Tablets  4mg    2 tablets once    Unknown



   10/30/2017          daily    

 

 Paroxetine HCL   -  Hx  Tablets  10mg    1 by mouth every    Unknown



   2017          day with20 mg    



             tablet    

 

 Paroxetine HCL   -  Hx  Tablets  20mg    1 by mouth every    Unknown



   2018          day    

 

 Zolpidem   -  Hx  Tablets  10mg    1/2 to 1 tab by    Unknown



 Tartrate  2018          mouth every    



             night at bedtime    



             as needed    

 

 Tylenol Extra   -  Hx  Tablets  500mg    2 by mouth as    Unknown



 Strength  2018          needed    

 

 Advil   -  Hx  Capsules  200mg    as needed    Unknown



   2017              

 

 Garcinia   -  Hx  Tablets  500-200m    2 by mouth twice    Unknown



 Cambogia-Chromi  2018      g-mcg    a day    



 um                

 

 Furosemide   -  Hx  Tablets  Unsure    1 by mouth every    Unknown



   2018          day    

 

 Paroxetine HCL   -  Hx  Tablets  10mg    1 by mouth one    Other



   2018          time per day    Physician



                 Practices

 

 Biotin   -  Hx    1000mcg    1 by mouth one    Unknown



   10/03/2018          time per day    







Immunizations







 CPT Code  Status  Date  Vaccine  Reaction  Lot #

 

 [id number]  Given  2018  Influenza Virus Vaccine,  no immediate reaction  
[id number]



       Quadrivalent, Split,    



       Preservative Free    

 

 19785  Given  2018  Pneumococcal Conjugate  no immediate reaction  [id number]



       Vaccine 13 Valent For  notedt  



       Intramuscular Use    







Vital Signs







 Date  Vital  Result  Comment

 

 2019 11:01am  Height  68 inches  5'8"









 Weight  348.38 lb  

 

 Heart Rate  100 /min  

 

 BP Systolic Sitting  136 mmHg  Lue large cuff

 

 BP Diastolic Sitting  90 mmHg  Lue large cuff

 

 Respiratory Rate  22 /min  

 

 O2 % BldC Oximetry  98 %  On Ra

 

 BMI (Body Mass Index)  53.0 kg/m2  









 2018  8:42am  Height  68 inches  5'8"









 Weight  356.50 lb  

 

 Heart Rate  89 /min  

 

 BP Systolic  116 mmHg  

 

 BP Diastolic  78 mmHg  

 

 Pain Level  6  

 

 O2 % BldC Oximetry  97 %  

 

 BMI (Body Mass Index)  54.2 kg/m2  









 2018 10:54am  Height  68 inches  5'8"









 Weight  361.12 lb  

 

 Heart Rate  92 /min  

 

 BP Systolic Sitting  114 mmHg  Rue (Wrist) regular cuff

 

 BP Diastolic Sitting  74 mmHg  Rue (Wrist) regular cuff

 

 Respiratory Rate  14 /min  

 

 O2 % BldC Oximetry  96 %  

 

 BMI (Body Mass Index)  54.9 kg/m2  









 10/03/2018  9:52am  Height  68 inches  5'8"









 Heart Rate  84 /min  

 

 BP Systolic Sitting  112 mmHg  

 

 BP Diastolic Sitting  80 mmHg  

 

 Respiratory Rate  14 /min  

 

 Pain Level  5  









 2018 11:54am  Height  68 inches  5'8"









 Weight  355.50 lb  

 

 Heart Rate  78 /min  

 

 BP Systolic Sitting  114 mmHg  

 

 BP Diastolic Sitting  80 mmHg  

 

 Pain Level  7  

 

 Peak Flow Meter  98  

 

 BMI (Body Mass Index)  54.0 kg/m2  









 2018  8:36am  Height  68 inches  5'8"









 Weight  348.25 lb  

 

 Heart Rate  96 /min  

 

 BP Systolic Sitting  110 mmHg  

 

 BP Diastolic Sitting  78 mmHg  

 

 Pain Level  9  

 

 O2 % BldC Oximetry  98 %  

 

 BMI (Body Mass Index)  52.9 kg/m2  









 2018 12:07pm  Height  68 inches  5'8"









 Weight  347.25 lb  

 

 Heart Rate  92 /min  

 

 BP Systolic Sitting  110 mmHg  Rue large cuff

 

 BP Diastolic Sitting  90 mmHg  Rue large cuff

 

 Respiratory Rate  16 /min  

 

 O2 % BldC Oximetry  97 %  On Ra

 

 BMI (Body Mass Index)  52.8 kg/m2  

 

 Neck Circumference in inches  17.75  









 2018  8:06am  Height  68 inches  5'8"









 Weight  349.00 lb  

 

 Heart Rate  90 /min  

 

 BP Systolic Standing  124 mmHg  

 

 BP Diastolic Standing  82 mmHg  

 

 Respiratory Rate  14 /min  

 

 Pain Level  5  

 

 BMI (Body Mass Index)  53.1 kg/m2  









 2018 10:41am  Height  68 inches  5'8"









 Weight  348.00 lb  

 

 Heart Rate  94 /min  

 

 BP Systolic Sitting  110 mmHg  

 

 BP Diastolic Sitting  74 mmHg  

 

 Respiratory Rate  16 /min  

 

 Pain Level  4  

 

 BMI (Body Mass Index)  52.9 kg/m2  









 2018 10:57am  Height  68 inches  5'8"









 Weight  353.00 lb  

 

 Heart Rate  88 /min  

 

 BP Systolic Sitting  114 mmHg  

 

 BP Diastolic Sitting  78 mmHg  

 

 Respiratory Rate  14 /min  

 

 Pain Level  8  

 

 BMI (Body Mass Index)  53.7 kg/m2  









 2018  9:10am  Height  68 inches  5'8"









 Weight  347.00 lb  

 

 Heart Rate  90 /min  

 

 BP Systolic Sitting  122 mmHg  

 

 BP Diastolic Sitting  84 mmHg  

 

 Respiratory Rate  14 /min  

 

 Pain Level  7  

 

 BMI (Body Mass Index)  52.8 kg/m2  









 10/30/2017  3:14pm  Height  68 inches  5'8"









 Weight  336.00 lb  

 

 Heart Rate  100 /min  

 

 BP Systolic Sitting  120 mmHg  

 

 BP Diastolic Sitting  84 mmHg  

 

 Respiratory Rate  14 /min  

 

 Pain Level  7  

 

 BMI (Body Mass Index)  51.1 kg/m2  









 2017  4:49pm  Height  68 inches  5'8"









 Weight  335.00 lb  

 

 Heart Rate  90 /min  

 

 BP Systolic Sitting  110 mmHg  

 

 BP Diastolic Sitting  70 mmHg  

 

 Respiratory Rate  14 /min  

 

 Pain Level  8  

 

 BMI (Body Mass Index)  50.9 kg/m2  









 2017 12:55pm  Height  68 inches  5'8"









 Weight  340.00 lb  

 

 Heart Rate  88 /min  

 

 BP Systolic Sitting  110 mmHg  

 

 BP Diastolic Sitting  70 mmHg  

 

 Respiratory Rate  14 /min  

 

 Pain Level  5  

 

 BMI (Body Mass Index)  51.7 kg/m2  







Results







 Test  Date  Facility  Test  Result  H/L  Range  Note

 

 Laboratory test  2018  [facility]  Miscellaneous Test  SEE 
COMMENT      1



 finding    [address]          



     [place] [address] [phone]          

 

 Laboratory test  2018  [facility]  Erythrocyte Sed  34 mm/Hr  
High  0-14  



 finding    [address]  Rate        



     [place] [address] [phone]          









 C Reactive Protein  13.06 mg/L  High  <8.01  









 Comp Metabolic Panel  2018  [facility]  Sodium  141 mmol/L  N
  135-145  



     [address]          



     [place] [address] [phone]          









 Potassium  4.1 mmol/L  N  3.5-5.0  

 

 Chloride  105 mmol/L  N  101-111  

 

 Co2 Carbon Dioxide  29 mmol/L  N  22-32  

 

 Anion Gap  7 mmol/L  N  2-11  

 

 Glucose  91 mg/dL  N    

 

 Blood Urea Nitrogen  9 mg/dL  N  6-24  

 

 Creatinine  1.06 mg/dL  High  0.51-0.95  

 

 BUN/Creatinine Ratio  8.5  N  8-20  

 

 Calcium  9.2 mg/dL  N  8.6-10.3  

 

 Total Protein  6.7 g/dL  N  6.4-8.9  

 

 Albumin  4.1 g/dL  N  3.2-5.2  

 

 Globulin  2.6 g/dL  N  2-4  

 

 Albumin/Globulin Ratio  1.6  N  1-3  

 

 Total Bilirubin  0.50 mg/dL  N  0.2-1.0  

 

 Alkaline Phosphatase  78 U/L  N    

 

 Alt  23 U/L  N  7-52  

 

 Ast  24 U/L  N  13-39  

 

 Egfr Non-  57.7    >60  

 

 Egfr   69.8    >60  2









 CBC Auto Diff  2018  [facility]  White Blood  9.6 10^3/uL  N  
3.5-10.8  



     [address]  Count        



     [place] [address] [phone]          









 Red Blood Count  5.00 10^6/uL  N  4.00-5.40  

 

 Hemoglobin  14.8 g/dL  N  12.0-16.0  

 

 Hematocrit  44 %  N  35-47  

 

 Mean Corpuscular Volume  87 fL  N  80-97  

 

 Mean Corpuscular Hemoglobin  30 pg  N  27-31  

 

 Mean Corpuscular HGB Conc  34 g/dL  N  31-36  

 

 Red Cell Distribution Width  13 %  N  10.5-15  

 

 Platelet Count  207 10^3/uL  N  150-450  

 

 Mean Platelet Volume  9.7 fL  N  7.4-10.4  

 

 Abs Neutrophils  5.6 10^3/uL  N  1.5-7.7  

 

 Abs Lymphocytes  2.8 10^3/uL  N  1.0-4.8  

 

 Abs Monocytes  0.9 10^3/uL  High  0-0.8  

 

 Abs Eosinophils  0.2 10^3/uL  N  0-0.6  

 

 Abs Basophils  0 10^3/uL  N  0-0.2  

 

 Abs Nucleated RBC  0 10^3/uL      

 

 Granulocyte %  58.5 %      

 

 Lymphocyte %  29.6 %      

 

 Monocyte %  9.6 %      

 

 Eosinophil %  1.9 %      

 

 Basophil %  0.4 %      

 

 Nucleated Red Blood Cells %  0.1      









 Laboratory test  10/08/2018  [facility]  C Reactive  18.93 mg/L  
High  <8.01  



 finding    [address]  Protein        



     [place] [address] [phone]          









 Erythrocyte Sed Rate  20 mm/Hr  High  0-14  









 CBC Auto Diff  10/08/2018  [facility]  White Blood  6.3 10^3/uL  N  
3.5-10.8  



     [address]  Count        



     [place] [address] [phone]          









 Red Blood Count  4.70 10^6/uL  N  4.00-5.40  

 

 Hemoglobin  14.4 g/dL  N  12.0-16.0  

 

 Hematocrit  41 %  N  35-47  

 

 Mean Corpuscular Volume  88 fL  N  80-97  

 

 Mean Corpuscular Hemoglobin  31 pg  N  27-31  

 

 Mean Corpuscular HGB Conc  35 g/dL  N  31-36  

 

 Red Cell Distribution Width  13 %  N  10.5-15  

 

 Platelet Count  142 10^3/uL  Low  150-450  

 

 Mean Platelet Volume  9.7 um3  N  7.4-10.4  

 

 Abs Neutrophils  3.5 10^3/uL  N  1.5-7.7  

 

 Abs Lymphocytes  2.1 10^3/uL  N  1.0-4.8  

 

 Abs Monocytes  0.6 10^3/uL  N  0-0.8  

 

 Abs Eosinophils  0.1 10^3/uL  N  0-0.6  

 

 Abs Basophils  0 10^3/uL  N  0-0.2  

 

 Abs Nucleated RBC  0 10^3/uL      

 

 Granulocyte %  55.2 %  N  38-83  

 

 Lymphocyte %  33.7 %  N  25-47  

 

 Monocyte %  9.0 %  High  0-7  

 

 Eosinophil %  1.4 %  N  0-6  

 

 Basophil %  0.7 %  N  0-2  

 

 Nucleated Red Blood Cells %  0.1      









 Comp Metabolic Panel  10/08/2018  [facility]  Sodium  139 mmol/L  N
  135-145  



     [address]          



     [place] [address] [phone]          









 Potassium  3.9 mmol/L  N  3.5-5.0  

 

 Chloride  107 mmol/L  N  101-111  

 

 Co2 Carbon Dioxide  29 mmol/L  N  22-32  

 

 Anion Gap  3 mmol/L  N  2-11  

 

 Glucose  89 mg/dL  N    

 

 Blood Urea Nitrogen  13 mg/dL  N  6-24  

 

 Creatinine  1.03 mg/dL  High  0.51-0.95  

 

 BUN/Creatinine Ratio  12.6  N  8-20  

 

 Calcium  9.0 mg/dL  N  8.6-10.3  

 

 Total Protein  6.3 g/dL  Low  6.4-8.9  

 

 Albumin  3.9 g/dL  N  3.2-5.2  

 

 Globulin  2.4 g/dL  N  2-4  

 

 Albumin/Globulin Ratio  1.6  N  1-3  

 

 Total Bilirubin  0.40 mg/dL  N  0.2-1.0  

 

 Alkaline Phosphatase  64 U/L  N    

 

 Alt  29 U/L  N  7-52  

 

 Ast  26 U/L  N  13-39  

 

 Egfr Non-  59.7    >60  

 

 Egfr   72.2    >60  3









 Laboratory test  2018  [facility] [place] [facility]  Esr Sedimentation  <pending>
      



 finding      Rate        









 CRP C-Reactive Protein  <pending>      









 CBC W/Auto Diff  2018  [facility] [place] [facility]  White Blood Count  <pending>
      









 RBC Red Blood Count  <pending>      

 

 Hemoglobin  <pending>      

 

 Hematocrit  <pending>      

 

 MCV (Corpuscular Volume)  <pending>      

 

 MCH (Corpuscular Hemoglobin)  <pending>      

 

 MCHC (Corpuscular Hemog Conc)  <pending>      

 

 RDW  <pending>      

 

 Platelet Count  <pending>      

 

 MPV  <pending>      

 

 Neutrophils  <pending>      

 

 Bands  <pending>      

 

 Lymphocytes  <pending>      

 

 Monocytes  <pending>      

 

 Eosinophils  <pending>      

 

 Basophils  <pending>      

 

 Absolute Basophil  <pending>      

 

 Absolute Eosinophil  <pending>      

 

 Absolute Lymphocyte  <pending>      

 

 Absolute Monocytes  <pending>      

 

 Absolute Neutrophils  <pending>      









 CMP Panel  2018  [facility] [place] [facility]  Albumin  <pending>      









 Alt - SGPT  <pending>      

 

 Calcium  <pending>      

 

 Carbon Dioxide  <pending>      

 

 Chloride  <pending>      

 

 Creatinine  <pending>      

 

 Glucose Serum  <pending>      

 

 Alkaline Phosphatase  <pending>      

 

 Potassium  <pending>      

 

 Total Protein  <pending>      

 

 Sodium  <pending>      

 

 Ast - Sgot  <pending>      

 

 BUN - Urea Nitrogen  <pending>      









 Cardiolipin Antibody  2018  [facility] [place] [facility]  Cardiolipin Antibody  <
pending>      



 Igg/Igm      Igg        









 Cardiolipin Antibody Igm  <pending>      









 Laboratory test  2018  [facility] [place] [facility]  CK - Creatine  <pending>    
  



 finding      Kinase        

 

 Laboratory test  2018  [facility]  Erythrocyte Sed  24 mm/Hr  
High  0-14  



 finding    [address]  Rate        



     [place] [address] [phone]          









 C Reactive Protein  7.71 mg/L  N  <8.01  

 

 Ferritin  36.0 ng/mL  N    









 CBC Auto Diff  2018  [facility]  White Blood  9.0 10^3/uL  N  
3.5-10.8  



     [address]  Count        



     [place] [address] [phone]          









 Red Blood Count  4.67 10^6/uL  N  4.00-5.40  

 

 Hemoglobin  14.6 g/dL  N  12.0-16.0  

 

 Hematocrit  42 %  N  35-47  

 

 Mean Corpuscular Volume  91 fL  N  80-97  

 

 Mean Corpuscular Hemoglobin  31 pg  N  27-31  

 

 Mean Corpuscular HGB Conc  35 g/dL  N  31-36  

 

 Red Cell Distribution Width  14 %  N  10.5-15  

 

 Platelet Count  174 10^3/uL  N  150-450  

 

 Mean Platelet Volume  9.9 um3  N  7.4-10.4  

 

 Abs Neutrophils  5.2 10^3/uL  N  1.5-7.7  

 

 Abs Lymphocytes  2.8 10^3/uL  N  1.0-4.8  

 

 Abs Monocytes  0.8 10^3/uL  N  0-0.8  

 

 Abs Eosinophils  0.1 10^3/uL  N  0-0.6  

 

 Abs Basophils  0 10^3/uL  N  0-0.2  

 

 Abs Nucleated RBC  0 10^3/uL      

 

 Granulocyte %  58.1 %  N  38-83  

 

 Lymphocyte %  31.6 %  N  25-47  

 

 Monocyte %  8.6 %  High  0-7  

 

 Eosinophil %  1.2 %  N  0-6  

 

 Basophil %  0.5 %  N  0-2  

 

 Nucleated Red Blood Cells %  0      









 Comp Metabolic Panel  2018  [facility]  Sodium  140 mmol/L  N
  135-145  



     [address]          



     [place] [address] [phone]          









 Potassium  4.3 mmol/L  N  3.5-5.0  

 

 Chloride  104 mmol/L  N  101-111  

 

 Co2 Carbon Dioxide  29 mmol/L  N  22-32  

 

 Anion Gap  7 mmol/L  N  2-11  

 

 Glucose  82 mg/dL  N    

 

 Blood Urea Nitrogen  12 mg/dL  N  6-24  

 

 Creatinine  1.06 mg/dL  High  0.51-0.95  

 

 BUN/Creatinine Ratio  11.3  N  8-20  

 

 Calcium  9.4 mg/dL  N  8.6-10.3  

 

 Total Protein  6.2 g/dL  Low  6.4-8.9  

 

 Albumin  3.9 g/dL  N  3.2-5.2  

 

 Globulin  2.3 g/dL  N  2-4  

 

 Albumin/Globulin Ratio  1.7  N  1-3  

 

 Total Bilirubin  0.60 mg/dL  N  0.2-1.0  

 

 Alkaline Phosphatase  72 U/L  N    

 

 Alt  29 U/L  N  7-52  

 

 Ast  31 U/L  N  13-39  

 

 Egfr Non-  57.7    >60  

 

 Egfr   69.8    >60  4









 Lipid Profile  2018  [facility]  Triglycerides  85 mg/dL      
5



 (Trig/Chol/HDL)    [address]          



     [place] [address] [phone]          









 Cholesterol  133 mg/dL      6

 

 HDL Cholesterol  42.0 mg/dL      7

 

 LDL Cholesterol  74 mg/dL      8









 Laboratory test  2018  [facility]  Erythrocyte Sed  4 mm/Hr  
N  0-14  9



 finding    [address]  Rate        



     [place] [address] [phone]          









 C Reactive Protein  < 1.00 mg/L  N  < 5.00  10









 CBC Auto Diff  2018  [facility]  White Blood  8.4 10^3/uL  N  
3.5-10.8  



     [address]  Count        



     [place] [address] [phone]          









 Red Blood Count  5.35 10^6/uL  N  4.00-5.40  

 

 Hemoglobin  16.5 g/dL  High  12.0-16.0  

 

 Hematocrit  48 %  High  35-47  

 

 Mean Corpuscular Volume  90 fL  N  80-97  

 

 Mean Corpuscular Hemoglobin  31 pg  N  27-31  

 

 Mean Corpuscular HGB Conc  34 g/dL  N  31-36  

 

 Red Cell Distribution Width  14 %  N  10.5-15  

 

 Platelet Count  164 10^3/uL  N  150-450  

 

 Mean Platelet Volume  10.0 um3  N  7.4-10.4  

 

 Abs Neutrophils  5.0 10^3/uL  N  1.5-7.7  

 

 Abs Lymphocytes  2.7 10^3/uL  N  1.0-4.8  

 

 Abs Monocytes  0.5 10^3/uL  N  0-0.8  

 

 Abs Eosinophils  0.2 10^3/uL  N  0-0.6  

 

 Abs Basophils  0.1 10^3/uL  N  0-0.2  

 

 Abs Nucleated RBC  0 10^3/uL      

 

 Granulocyte %  59.2 %  N  38-83  

 

 Lymphocyte %  31.7 %  N  25-47  

 

 Monocyte %  6.4 %  N  0-7  

 

 Eosinophil %  2.0 %  N  0-6  

 

 Basophil %  0.7 %  N  0-2  

 

 Nucleated Red Blood Cells %  0.1      









 Comp Metabolic Panel  2018  [facility]  Sodium  139 mmol/L  N
  139-145  



     [address]          



     [place] [address] [phone]          









 Potassium  4.3 mmol/L  N  3.5-5.0  

 

 Chloride  101 mmol/L  N  101-111  

 

 Co2 Carbon Dioxide  30 mmol/L  N  22-32  

 

 Anion Gap  8 mmol/L  N  2-11  

 

 Glucose  100 mg/dL  N    

 

 Blood Urea Nitrogen  11 mg/dL  N  6-24  

 

 Creatinine  1.39 mg/dL  High  0.51-0.95  

 

 BUN/Creatinine Ratio  7.9  Low  8-20  

 

 Calcium  9.6 mg/dL  N  8.6-10.3  

 

 Total Protein  6.8 g/dL  N  6.4-8.9  

 

 Albumin  4.3 g/dL  N  3.2-5.2  

 

 Globulin  2.5 g/dL  N  2-4  

 

 Albumin/Globulin Ratio  1.7  N  1-3  

 

 Total Bilirubin  0.50 mg/dL  N  0.2-1.0  

 

 Alkaline Phosphatase  65 U/L  N    

 

 Alt  44 U/L  N  7-52  

 

 Ast  45 U/L  High  13-39  

 

 Egfr Non-  42.2    >60  

 

 Egfr   54.3    >60  11









 Laboratory test  2018  [facility]  TSH (Thyroid  1.88 mcIU/mL
  N  0.34-5.60  12



 finding    [address]  [facility])        



     [place] [address] [phone]          









 Lyme Disease Serology  Negative    Negative  13









 Urinalysis Profile  2018  [facility]  Urine Color  Yellow    
  



     [address]          



     [place] [address] [phone]          









 Urine Appearance  Cloudy      

 

 Urine Specific Gravity  1.017  N  1.010-1.030  

 

 Urine pH  6.0  N  5-9  

 

 Urine Urobilinogen  Negative    Negative  

 

 Urine Ketones  Trace  Abnormal  Negative  

 

 Urine Protein  1+(30 mg/dL)  Abnormal  Negative  

 

 Urine Leukocytes  Trace  Abnormal  Negative  

 

 Urine Blood  Negative    Negative  

 

 Urine Nitrite  Negative    Negative  

 

 Urine Bilirubin  Negative    Negative  

 

 Urine Glucose  Negative    Negative  

 

 Urine White Blood Cell  Trace(0-5/hpf)    Absent  

 

 Urine Red Blood Cell  Trace(0-2/hpf)    Absent  

 

 Urine Bacteria  Absent    Absent  

 

 Urine Squamous Epithelial Cell  Present  Abnormal  Absent  

 

 Urine Hyaline Casts  Present  Abnormal  Absent  









 Urine Culture And  2018  [facility]  Urine Culture  SEE 
RESULT      14



 Sensitivities    [address]    BELOW      



     [place] [address] [phone]          

 

 Laboratory test  2018  [facility]  Erythrocyte Sed  25 mm/Hr  
High  0-14  



 finding    [address]  Rate        



     [place] [address] [phone]          









 C Reactive Protein  19.86 mg/L  High  < 5.00  15









 CBC Auto Diff  2018  [facility]  White Blood  8.0 10^3/uL  N  
3.5-10.8  



     [address]  Count        



     [place] [address] [phone]          









 Red Blood Count  4.85 10^6/uL  N  4.0-5.4  

 

 Hemoglobin  14.8 g/dL  N  12.0-16.0  

 

 Hematocrit  43 %  N  35-47  

 

 Mean Corpuscular Volume  89 fL  N  80-97  

 

 Mean Corpuscular Hemoglobin  31 pg  N  27-31  

 

 Mean Corpuscular HGB Conc  35 g/dL  N  31-36  

 

 Red Cell Distribution Width  13 %  N  10.5-15  

 

 Platelet Count  144 10^3/uL  Low  150-450  

 

 Mean Platelet Volume  10.1 um3  N  7.4-10.4  

 

 Abs Neutrophils  4.9 10^3/uL  N  1.5-7.7  

 

 Abs Lymphocytes  2.3 10^3/uL  N  1.0-4.8  

 

 Abs Monocytes  0.6 10^3/uL  N  0-0.8  

 

 Abs Eosinophils  0.2 10^3/uL  N  0-0.6  

 

 Abs Basophils  0 10^3/uL  N  0-0.2  

 

 Abs Nucleated RBC  0 10^3/uL      

 

 Granulocyte %  61.3 %  N  38-83  

 

 Lymphocyte %  28.3 %  N  25-47  

 

 Monocyte %  7.9 %  High  0-7  

 

 Eosinophil %  1.9 %  N  0-6  

 

 Basophil %  0.6 %  N  0-2  

 

 Nucleated Red Blood Cells %  0.1      









 Comp Metabolic Panel  2018  [facility]  Sodium  140 mmol/L  N
  139-145  



     [address]          



     [place] [address] [phone]          









 Potassium  4.0 mmol/L  N  3.5-5.0  

 

 Chloride  104 mmol/L  N  101-111  

 

 Co2 Carbon Dioxide  28 mmol/L  N  22-32  

 

 Anion Gap  8 mmol/L  N  2-11  

 

 Glucose  87 mg/dL  N    

 

 Blood Urea Nitrogen  13 mg/dL  N  6-24  

 

 Creatinine  1.17 mg/dL  High  0.51-0.95  

 

 BUN/Creatinine Ratio  11.1  N  8-20  

 

 Calcium  9.5 mg/dL  N  8.6-10.3  

 

 Total Protein  6.3 g/dL  Low  6.4-8.9  

 

 Albumin  3.9 g/dL  N  3.2-5.2  

 

 Globulin  2.4 g/dL  N  2-4  

 

 Albumin/Globulin Ratio  1.6  N  1-3  

 

 Total Bilirubin  0.50 mg/dL  N  0.2-1.0  

 

 Alkaline Phosphatase  66 U/L  N    

 

 Alt  26 U/L  N  7-52  

 

 Ast  24 U/L  N  13-39  

 

 Egfr Non-  51.5    >60  

 

 Egfr   66.2    >60  16









 Lipid Profile  2018  [facility]  Triglycerides  59 mg/dL      
17



 (Trig/Chol/HDL)    [address]          



     [place] [address] [phone]          









 Cholesterol  132 mg/dL      18

 

 HDL Cholesterol  43.1 mg/dL      19

 

 LDL Cholesterol  77 mg/dL      20









 Laboratory test  2018  [facility]  Creatine  121 U/L  N  10-
223  



 finding    [address]  Kinase(CK)        



     [place] [address] [phone]          









 Cortisol  13.14 g/dL      21

 

 TSH (Thyroid Stim Horm)  2.43 mcIU/mL  N  0.34-5.60  









 Laboratory test  2018  [facility]  C Reactive  12.11 mg/L  
High  < 5.00  22



 finding    [address]  Protein        



     [place] [address] [phone]          









 TSH (Thyroid Stim Horm)  2.43 mcIU/mL  N  0.34-5.60  

 

 Aldolase  9.6 U/L  Abnormal  <7.7  23









 Comp Metabolic Panel  2018  [facility]  Sodium  138 mmol/L  N
  133-145  



     [address]          



     [place] [address] [phone]          









 Potassium  4.3 mmol/L  N  3.5-5.0  

 

 Chloride  104 mmol/L  N  101-111  

 

 Co2 Carbon Dioxide  27 mmol/L  N  22-32  

 

 Anion Gap  7 mmol/L  N  2-11  

 

 Glucose  84 mg/dL  N    

 

 Blood Urea Nitrogen  14 mg/dL  N  6-24  

 

 Creatinine  1.09 mg/dL  High  0.51-0.95  

 

 BUN/Creatinine Ratio  12.8  N  8-20  

 

 Calcium  9.0 mg/dL  N  8.6-10.3  

 

 Total Protein  6.3 g/dL  Low  6.4-8.9  

 

 Albumin  3.8 g/dL  N  3.2-5.2  

 

 Globulin  2.5 g/dL  N  2-4  

 

 Albumin/Globulin Ratio  1.5  N  1-3  

 

 Total Bilirubin  0.50 mg/dL  N  0.2-1.0  

 

 Alkaline Phosphatase  55 U/L  N    

 

 Alt  14 U/L  N  7-52  

 

 Ast  17 U/L  N  13-39  

 

 Egfr Non-  56.2    >60  

 

 Egfr   72.2    >60  24









 Vitamin B12 And  2018  [facility]  Vitamin B12  587 pg/mL  N  
180-914  25



 Folate Serum    [address]          



     [place] [address] [phone]          









 Folic Acid (Folate)  14.58 ng/mL    >3.99  









 Laboratory test  2018  [facility]  Creatine  67 U/L  N  10-
223  



 finding    [address]  Kinase(CK)        



     [place] [address] [phone]          

 

 Vitamin D 1,25  2018  [facility]  Vitamin D Total  39.7  N  20
-50  



 And Vitamin D,2    [address]  25(Oh)  ng/mL      



     [place] [address] [phone]          









 Vitamin D, 1,25 Dihydroxy  21 pg/mL    18-78  26









 Laboratory test  2018  [facility]  Magnesium  2.4 mg/dL  N  
1.9-2.7  



 finding    [address]          



     [place] [address] [phone]          

 

 Quantiferon Gold  10/23/2017  [facility]  M tuberculosis  Negative  
N  Negative  27



 TB    [address]  by [employer]        



     [place] [address] [phone]          









 TB Ag minus Nil Result  0 IU/mL  N    

 

 TB Mitogen minus Nil Result  > 10.00 IU/mL  N    

 

 TB Nil Result  0.02 IU/mL  N    28









 Laboratory test  2017  [facility]  Erythrocyte Sed  29 mm/Hr  
High  0-14  29



 finding    [address]  Rate        



     [place] [address] [phone]          









 C Reactive Protein  15.64 mg/L  High  < 5.00  30

 

 Rheumatoid Factor  <15 IU/mL  N  <15  31

 

 Cyclic Citrullinated Pep Igg  <15.6 U  N    32

 

 Creatine Kinase(CK)  80 U/L  N    33

 

 TSH (Thyroid Stim Horm)  2.79 mcIU/mL  N  0.34-5.60  34









 Vitamin B12 And  2017  [facility]  Vitamin B12  227 pg/mL  N  
180-914  35



 Folate Serum    [address]          



     [place] [address] [phone]          









 Folic Acid (Folate)  > 20.00 ng/mL  N  >3.99  36









 Cardiolipin  2017  [facility]  Phospholipid Ab  < 9.4 MPL  N 
   37



 Igg/Igm     [address]  IgM, [place]        



     [place] [address] [phone]          









 Phospholipid Ab IgG  < 9.4 GPL  N    38









 CBC Auto Diff  2017  [facility]  White Blood  10.8 10^3/uL  N
  3.5-10.8  



     [address]  Count        



     [place] [address] [phone]          









 Red Blood Count  4.62 10^6/uL  N  4.0-5.4  

 

 Hemoglobin  14.5 g/dL  N  12.0-16.0  

 

 Hematocrit  43 %  N  35-47  

 

 Mean Corpuscular Volume  93 fL  N  80-97  

 

 Mean Corpuscular Hemoglobin  31 pg  N  27-31  

 

 Mean Corpuscular HGB Conc  34 g/dL  N  31-36  

 

 Red Cell Distribution Width  15 %  N  10.5-15  

 

 Platelet Count  204 10^3/uL  N  150-450  

 

 Mean Platelet Volume  10 um3  N  7.4-10.4  

 

 Abs Neutrophils  7.7 10^3/uL  N  1.5-7.7  

 

 Abs Lymphocytes  2.4 10^3/uL  N  1.0-4.8  

 

 Abs Monocytes  0.5 10^3/uL  N  0-0.8  

 

 Abs Eosinophils  0.1 10^3/uL  N  0-0.6  

 

 Abs Basophils  0.1 10^3/uL  N  0-0.2  

 

 Abs Nucleated RBC  0.01 10^3/uL  N    

 

 Granulocyte %  71.8 %  N  38-83  

 

 Lymphocyte %  22.4 %  Low  25-47  

 

 Monocyte %  4.5 %  N  1-9  

 

 Eosinophil %  0.8 %  N  0-6  

 

 Basophil %  0.5 %  N  0-2  

 

 Nucleated Red Blood Cells %  0.1  N    









 Comp Metabolic Panel  2017  [facility]  Sodium  138 mmol/L  N
  133-145  



     [address] [place] [address] [phone]          









 Potassium  4.5 mmol/L  N  3.5-5.0  

 

 Chloride  103 mmol/L  N  101-111  

 

 Co2 Carbon Dioxide  27 mmol/L  N  22-32  

 

 Anion Gap  8 mmol/L  N  2-11  

 

 Glucose  93 mg/dL  N    

 

 Blood Urea Nitrogen  9 mg/dL  N  6-24  

 

 Creatinine  1.18 mg/dL  High  0.51-0.95  

 

 BUN/Creatinine Ratio  7.6  Low  8-20  

 

 Calcium  9.1 mg/dL  N  8.6-10.3  

 

 Total Protein  6.4 g/dL  N  6.4-8.9  

 

 Albumin  3.8 g/dL  N  3.2-5.2  

 

 Globulin  2.6 g/dL  N  2-4  

 

 Albumin/Globulin Ratio  1.5  N  1-3  

 

 Total Bilirubin  0.60 mg/dL  N  0.2-1.0  

 

 Alkaline Phosphatase  66 U/L  N    

 

 Alt  24 U/L  N  7-52  

 

 Ast  29 U/L  N  13-39  

 

 Egfr Non-  51.3  N  >60  

 

 Egfr   65.9  N  >60  39









 Laboratory test  2017  [facility]  Free Cortisol  0.25 g/dL
  N    40



 finding    [address]  Serum        



     [place] [address] [phone]          

 

 Hla B27  2017  [facility]  Hla B27  Negative  N    41



     [address]          



     [place] [address] [phone]          









 Hla B27 Interp  See Comment  N    42









 Laboratory test  2017  [facility]  Ferritin  59.6 ng/mL  N  11
-307  43



 finding    [address] [place] [address] [phone]          









 Vitamin D, 1,25 Dihydroxy  24 pg/mL  N  18-78  44









 Protein  2017  [facility]  Total  6.7 g/dL  N  6.3 -  



 Electrophoresis    [address]  [facility]  Protein(Pep)      7.9  



     [place] [address] [phone]          









 Albumin  3.4 g/dL  N  3.4-4.7  

 

 Alpha-1 Globulin  0.3 g/dL  N  0.1-0.3  

 

 Alpha-2 Globulin  1.1 g/dL  Abnormal  0.6-1.0  

 

 Beta Globulin  1.0 g/dL  N  0.7-1.2  

 

 Gamma Globulin  1.0 g/dL  N  0.6-1.6  

 

 Albumin/Globulin Ratio  1.03  N    

 

 Impression  See Comment  N    45









 Urinalysis Profile  2017  [facility]  Urine Color  Yellow  N 
   



     [address]  [place] [address] [phone]          









 Urine Appearance  Clear  N    

 

 Urine Specific Gravity  1.010  N  1.010-1.030  

 

 Urine pH  7.0  N  5-9  

 

 Urine Urobilinogen  Negative  N  Negative  

 

 Urine Ketones  Negative  N  Negative  

 

 Urine Protein  Negative  N  Negative  

 

 Urine Leukocytes  Negative  N  Negative  

 

 Urine Blood  Negative  N  Negative  

 

 Urine Nitrite  Negative  N  Negative  

 

 Urine Bilirubin  Negative  N  Negative  

 

 Urine Glucose  Negative  N  Negative  









 Laboratory  2017  [facility]  Magnesium  2.0 mg/dL  N  1.9-
2.7  46



 test finding    [address]  [place] [address] [phone]          

 

 Celiac Panel  2017  [facility]  Tissue  <1.2 U/mL  N    47



     [address]  [facility]  Transglutaminase IgA        



     [place] [address]  Ab        



     [phone]          









 Immunoglobulin A  228 mg/dL  N  61 - 356  

 

 Celiac Interpretation  See Comment  N    48









 Celiac Hla DQ1/DQ2  2017  [facility]  Hla-Dqa1  SEE BELOW  N 
   49



     [address] [place] [address] [phone]          









 Hla-DQB1  SEE BELOW  N    50

 

 Celiac Gene Pairs Present?  No  N    

 

 Celiac Gene Interpretation  See Comment  N    51









 1  Drug Screen 9 Panel, Serum or Plasma - Immunoassay Screen



   with Reflex to Mass Spectrometry Confirmation/Quantitation



   



   Procedure                    Result    Units  Ref Interval



   Amphetamines, S/P, screen    Negative  ng/mL  Cutoff 30



   



   Methamphetamine, S/P, screen Negative  ng/mL  Cutoff 30



   



   Barbiturates, S/P, screen    Negative  ng/mL  Cutoff 75



   



   Benzodiazepines, S/P, screen Negative  ng/mL  Cutoff 75



   



   Buprenorphine, S/P, screen   Negative  ng/mL  Cutoff 1



   



   Cannabinoids, S/P, screen    Negative  ng/mL  Cutoff 30



   



   Cocaine, S/P, Screen         Negative  ng/mL  Cutoff 30



   



   Methadone, S/P, screen       Negative  ng/mL  Cutoff 40



   



   Opiates, S/P, screen         Positive  ng/mL  Cutoff 30



   If the screen is positive, then confirmation by mass



   spectrometry will be added. Additional charges will apply.



   Unconfirmed positive may be useful for medical purposes,



   but does not meet forensic standards.



   



   Oxycodone, S/P, screen       Negative  ng/mL  Cutoff 30



   



   Phencyclidine, S/P, Screen   Negative  ng/mL  Cutoff 15



   



   Drug Screen Comments, Serum or Plasma



   INTERPRETIVE INFORMATION:



   1. Methodology: Qualitative Immunoassay Screen



   2. Drugs/Drug classes reported as "Positive" are



   automatically reflexed to mass spectrometry



   confirmation/quantitation testing. An immunoassay



   unconfirmed positive screen result may be useful for medical



   purposes but does not meet forensic standards.



   3. The absence of expected drug(s) and/or drug metabolite(s)



   may indicate non-compliance, inappropriate timing of



   specimen collection relative to drug administration, poor



   drug absorption, or limitations of testing. The



   concentration at which the screening test can detect a drug



   or metabolite varies within a drug class. Specimens for



   which drugs or drug classes are detected by the screen are



   automatically reflexed to a second, more specific technology



   (mass spectrometry). The concentration value must be greater



   than or equal to the cutoff to be reported as positive.



   Interpretive questions should be directed to the laboratory.



   4. For medical purposes only; not valid for forensic use.



   Test developed and characteristics determined by [facility]. See Compliance Statement B: [employer]



   



   Opiates, Serum or Plasma, Quantitative



   Procedure                    Result    Units



   6-acetylmorphine, S/P, Quant  <2       ng/mL



   



   INTERPRETIVE INFORMATION:



   Methodology: Quantitative Liquid Chromatography-Tandem Mass



   Spectrometry



   Positive cutoff: 2 ng/mL



   For medical purposes only; not valid for forensic use.



   Identification of specific drug(s) taken by specimen donor



   is problematic due to common metabolites, some of which are



   prescription drugs themselves. The absence of expected



   drug(s) and/or drug metabolite(s) may indicate



   non-compliance, inappropriate timing of specimen collection



   relative to drug administration, poor drug absorption, or



   limitations of testing. All drugs covered are the



   non-glucuronidated (free) form. The concentration value must



   be greater than or equal to the cutoff to be reported as



   positive. A very small amount of an unexpected drug analyte



   in the presence of a large amount of an expected drug



   analyte may reflect pharmaceutical impurity. Interpretive



   questions should be directed to the laboratory. Test



   developed and characteristics determined by [facility]. See Compliance Statement B: [employer]/



   



   Codeine, S/P, Quant           <2       ng/mL



   



   Morphine, S/P, Quant           3       ng/mL



   Morphine may arise from morphine-containing drugs, poppy



   seeds, or by metabolism of either codeine or 6-AM.



   Morphine is metabolized to hydromorphone.



   



   Hydrocodone, S/P, Quant        18      ng/mL



   Hydrocodone may arise from hydrocodone-containing drugs or



   as a metabolite of codeine. Hydrocodone is metabolized to



   hydromorphone.



   



   Hydromorphone, S/P, Quant     <2       ng/mL



   



   Oxycodone, S/P, Quant         <2       ng/mL



   



   Oxymorphone, S/P, Quant       <2       ng/mL



   



   Test Performed by:



   [email or web address]



   [email or web address]



   [address] [place] [id number]



   phone: [phone], toll free: [phone]



   Agus Akers MD, MS, Director of Laboratories

 

 2  *******Because ethnic data is not always readily available,



   this report includes an eGFR for both -Americans and



   non- Americans.****



   The National Kidney Disease Education Program (NKDEP) does



   not endorse the use of the MDRD equation for patients that



   are not between the ages of 18 and 70, are pregnant, have



   extremes of body size, muscle mass, or nutritional status,



   or are non- or non-.



   According to the National Kidney Foundation, irrespective of



   diagnosis, the stage of the disease is based on the level of



   kidney function:



   Stage Description                      GFR(mL/min/1.73 m(2))



   1     Kidney damage with normal or decreased GFR       90



   2     Kidney damage with mild decrease in GFR          60-89



   3     Moderate decrease in GFR                         30-59



   4     Severe decrease in GFR                           15-29



   5     Kidney failure                       <15 (or dialysis)

 

 3  *******Because ethnic data is not always readily available,



   this report includes an eGFR for both -Americans and



   non- Americans.****



   The National Kidney Disease Education Program (NKDEP) does



   not endorse the use of the MDRD equation for patients that



   are not between the ages of 18 and 70, are pregnant, have



   extremes of body size, muscle mass, or nutritional status,



   or are non- or non-.



   According to the National Kidney Foundation, irrespective of



   diagnosis, the stage of the disease is based on the level of



   kidney function:



   Stage Description                      GFR(mL/min/1.73 m(2))



   1     Kidney damage with normal or decreased GFR       90



   2     Kidney damage with mild decrease in GFR          60-89



   3     Moderate decrease in GFR                         30-59



   4     Severe decrease in GFR                           15-29



   5     Kidney failure                       <15 (or dialysis)

 

 4  *******Because ethnic data is not always readily available,



   this report includes an eGFR for both -Americans and



   non- Americans.****



   The National Kidney Disease Education Program (NKDEP) does



   not endorse the use of the MDRD equation for patients that



   are not between the ages of 18 and 70, are pregnant, have



   extremes of body size, muscle mass, or nutritional status,



   or are non- or non-.



   According to the [employer], irrespective of



   diagnosis, the stage of the disease is based on the level of



   kidney function:



   Stage Description                      GFR(mL/min/1.73 m(2))



   1     Kidney damage with normal or decreased GFR       90



   2     Kidney damage with mild decrease in GFR          60-89



   3     Moderate decrease in GFR                         30-59



   4     Severe decrease in GFR                           15-29



   5     Kidney failure                       <15 (or dialysis)

 

 5  Desirable: <150



   Borderline High: 150-199



   High: 200-499



   Very High: >500

 

 6  Desirable: <200



   Borderline High: 200-239



   High: >239

 

 7  Low: <40



   Desirable: 40-60



   High: >60

 

 8  Desirable: <100



   Near Optimal: 100-129



   Borderline High: 130-159



   High: 160-189



   Very High: >189

 

 9  Please schedule this week

 

 10  Acute inflammation:  >10.00

 

 11  *******Because ethnic data is not always readily available,



   this report includes an eGFR for both -Americans and



   non- Americans.****



   The National Kidney Disease Education Program (NKDEP) does



   not endorse the use of the MDRD equation for patients that



   are not between the ages of 18 and 70, are pregnant, have



   extremes of body size, muscle mass, or nutritional status,



   or are non- or non-.



   According to the [employer], irrespective of



   diagnosis, the stage of the disease is based on the level of



   kidney function:



   Stage Description                      GFR(mL/min/1.73 m(2))



   1     Kidney damage with normal or decreased GFR       90



   2     Kidney damage with mild decrease in GFR          60-89



   3     Moderate decrease in GFR                         30-59



   4     Severe decrease in GFR                           15-29



   5     Kidney failure                       <15 (or dialysis)

 

 12  Please schedule this week

 

 13  No evidence of antibodies to B. burgdorferi detected.



   False negative results may occur in recently infected



   patients (<=2 weeks) due to low or undetectable antibody



   levels to B. burgdorferi. If recent exposure is suspected,



   a second sample should be collected and tested in 2-4 weeks.



   Test Performed by:



   [facility]



   [address] [place] [address]

 

 14  SEE RESULT BELOW



   -----------------------------------------------------------------------------
---------------



   Name:  MANISHA NASH                  : 1980    Attend Dr: Barbara Smith MD



   Acct:  [id number]  Unit: [id number]  AGE: 38            Location:  ED



   Re18                        SEX: F             Status:    DEP ER



   -----------------------------------------------------------------------------
---------------



   



   SPEC: [id number]         LORIN:       [facility] DR: Barbara Smith MD



   REQ:  [id number]              RECD:   



   STATUS: MICHELLE PATEL DR: Juan Wood MD



   _



   SOURCE: URINE          SPDESC:



   ORDERED:  Urine Culture



   



   -----------------------------------------------------------------------------
---------------



   Procedure                         Result                         Reported   
        Site



   -----------------------------------------------------------------------------
---------------



   Urine Culture  Final                                             18-
[id number]      ML



   



   No growth of clinically significant organisms



   



   -----------------------------------------------------------------------------
---------------



   * ML - Main Lab



   .



   



   



   



   



   



   



   



   



   



   



   



   



   



   



   



   



   



   



   



   



   



   



   



   



   



   ** END OF REPORT **



   



   DEPARTMENT OF PATHOLOGY,  [address] [place] [address]



   Phone # [phone]      Fax #[phone]



   Iggy Penny M.D. Director     [facility] # [id number]

 

 15  Acute inflammation:  >10.00

 

 16  *******Because ethnic data is not always readily available,



   this report includes an eGFR for both -Americans and



   non- Americans.****



   The National Kidney Disease Education Program (NKDEP) does



   not endorse the use of the MDRD equation for patients that



   are not between the ages of 18 and 70, are pregnant, have



   extremes of body size, muscle mass, or nutritional status,



   or are non- or non-.



   According to the National Kidney Foundation, irrespective of



   diagnosis, the stage of the disease is based on the level of



   kidney function:



   Stage Description                      GFR(mL/min/1.73 m(2))



   1     Kidney damage with normal or decreased GFR       90



   2     Kidney damage with mild decrease in GFR          60-89



   3     Moderate decrease in GFR                         30-59



   4     Severe decrease in GFR                           15-29



   5     Kidney failure                       <15 (or dialysis)

 

 17  Desirable: <150



   Borderline High: 150-199



   High: 200-499



   Very High: >500

 

 18  Desirable: <200



   Borderline High: 200-239



   High: >239

 

 19  Low: <40



   Desirable: 40-60



   High: >60

 

 20  Desirable: <100



   Near Optimal: 100-129



   Borderline High: 130-159



   High: 160-189



   Very High: >189

 

 21  AM 8.7-22.4



   PM <10

 

 22  Acute inflammation:  >10.00

 

 23  Test Performed by:



   [address] [address]

 

 24  *******Because ethnic data is not always readily available,



   this report includes an eGFR for both -Americans and



   non- Americans.****



   The National Kidney Disease Education Program (NKDEP) does



   not endorse the use of the MDRD equation for patients that



   are not between the ages of 18 and 70, are pregnant, have



   extremes of body size, muscle mass, or nutritional status,



   or are non- or non-.



   According to the National Kidney Foundation, irrespective of



   diagnosis, the stage of the disease is based on the level of



   kidney function:



   Stage Description                      GFR(mL/min/1.73 m(2))



   1     Kidney damage with normal or decreased GFR       90



   2     Kidney damage with mild decrease in GFR          60-89



   3     Moderate decrease in GFR                         30-59



   4     Severe decrease in GFR                           15-29



   5     Kidney failure                       <15 (or dialysis)

 

 25  Normal Range 180 to 914



   Indeterminate Range 145 to 180



   Deficient Range  <145

 

 26  -------------------ADDITIONAL INFORMATION-------------------



   This test was developed and its performance characteristics



   determined by [facility] in a manner consistent with CLIA



   requirements. This test has not been cleared or approved by



   the U.S. Food and Drug Administration.



   Test Performed by:



   [place] [address]

 

 27  No interferon-gamma response to M. tuberculosis antigens



   was detected. Infection with M. tuberculosis is unlikely.



   A negative result alone does not exclude infection with



   M. tuberculosis.



   For detailed information regarding test interpretation see:



   [email or web address]



   [email or web address]

 

 28  Test Performed by:



   [facility] [place] [address]

 

 29  Please check labs this week

 

 30  Acute inflammation:  >10.00

 

 31  Test Performed by:



   [place] [id number]

 

 32  -------------------REFERENCE VALUE--------------------------



   <20.0 (Negative)



   Test Performed by:



   [address] [address]

 

 33  Please check labs this week

 

 34  Please check labs this week

 

 35  Normal Range 180 to 914



   Indeterminate Range 145 to 180



   Deficient Range  <145

 

 36  Please check labs this week

 

 37  -------------------REFERENCE VALUE--------------------------



   <15.0 (Negative)

 

 38  -------------------REFERENCE VALUE--------------------------



   <15.0 (Negative)



   Test Performed by:



   [address] [address]

 

 39  *******Because ethnic data is not always readily available,



   this report includes an eGFR for both -Americans and



   non- Americans.****



   The National Kidney Disease Education Program (NKDEP) does



   not endorse the use of the MDRD equation for patients that



   are not between the ages of 18 and 70, are pregnant, have



   extremes of body size, muscle mass, or nutritional status,



   or are non- or non-.



   According to the National Kidney Foundation, irrespective of



   diagnosis, the stage of the disease is based on the level of



   kidney function:



   Stage Description                      GFR(mL/min/1.73 m(2))



   1     Kidney damage with normal or decreased GFR       90



   2     Kidney damage with mild decrease in GFR          60-89



   3     Moderate decrease in GFR                         30-59



   4     Severe decrease in GFR                           15-29



   5     Kidney failure                       <15 (or dialysis)

 

 40  Adult Reference Ranges for Cortisol, Free,



   LC/MS/MS:



   8:00 - 10:00 AM      0.07-0.93 mcg/dL



   4:00 -  6:00 PM      0.04-0.45 mcg/dL



   10:00 - 11:00 PM      0.04-0.35 mcg/dL



   This test was developed and its analytical performance



   characteristics have been determined by [facility]



   [facility]. It has not been



   cleared or approved by FDA. This assay has been validated



   pursuant to the CLIA regulations and is used for clinical



   purposes.



   Test Performed by:



   [facility]



   [address] [place] [address]

 

 41  -------------------REFERENCE VALUE--------------------------



   Not Applicable

 

 42  RESULT: HLA-B27 antigen was not detected.



   -------------------ADDITIONAL INFORMATION-------------------



   Method: Flow Cytometry



   Performing Laboratory CLIA# [id number]



   Test Performed by:



   [facility]



   [address] [place] [address]

 

 43  Please check labs this week

 

 44  -------------------ADDITIONAL INFORMATION-------------------



   This test was developed and its performance characteristics



   determined by [facility] in a manner consistent with CLIA



   requirements. This test has not been cleared or approved by



   the U.S. Food and Drug Administration.



   Test Performed by:



   [facility] [address] [address]

 

 45  RESULT: No apparent monoclonal protein on serum electrophoresis.



   Test Performed by:



   [address] [address]

 

 46  Please check labs this week

 

 47  -------------------REFERENCE VALUE--------------------------



   <4.0 (Negative)



   Test Performed by:



   [address] [address]

 

 48  Negative serology. Celiac disease unlikely. However,



   approximately 10% of patients with celiac disease are



   seronegative. Also, patients who are already adhering to a



   gluten-free diet may be seronegative. If celiac disease is



   highly clinically suspected, consider HLA-DQ typing.



   Test Performed by:



   [facility] [address] [address]

 

 49  RESULT: 01,01:02



   -------------------REFERENCE VALUE--------------------------



   Not Applicable

 

 50  RESULT: 05:01,06:02



   DQ Serologic Equivalent:



   5,6



   -------------------REFERENCE VALUE--------------------------



   Not Applicable

 

 51  The absence of HLA celiac permissive genes would make the



   presence of celiac disease unlikely.



   -------------------ADDITIONAL INFORMATION-------------------



   Method: Molecular typing of HLA antigens performed using



   reverse SSOP and/or SSP methods, reported as serological



   equivalents and low to medium resolution molecular values.



   Performing Laboratory CLIA# [id number]



   Test Performed by:



   [address] [address]







Procedures







 Date  Code  Description  Status

 

 10/04/2018  06133521  Mammogram  Completed

 

 2018  62161  Polysomnography Sleep Staging 4+ Parameters  Completed

 

 2017  36955  ECHO Transthorasic Realtime 2D W Doppler & Color Flow  
Completed



     Hosp  

 

 2017  77697  EEG Recording Awake & Drowsy  Completed







Encounters







 Type  Date  Location  Provider  Dx  Diagnosis

 

 Office Visit  2018  Rheumatology  KENDY Sabillon  Rheumatoid



   8:40a  [facility] Of [facility]    arthritis,



           unspecified









 I73.00  Raynaud's syndrome without gangrene

 

 G47.00  Insomnia, unspecified

 

 Z79.899  Other long term (current) drug therapy

 

 M79.7  Fibromyalgia









 Office Visit  2018  Pulmonology And  Elisa  G47.33  Obstructive sleep



   11:00a  [facility]  ANDREINA Cronin, RN,    apnea (adult)



     [facility]  FNP-BC    (pediatric)









 F17.210  Nicotine dependence, cigarettes, uncomplicated

 

 Z68.43  Body mass index (BMI) 50-59.9, adult









 Office Visit  10/03/2018  9:40a  Rheumatology  Bertrand NUÑEZ6.9  Rheumatoid



     [facility] Wood M.D.    arthritis,



           unspecified









 R20.8  Other disturbances of skin sensation









 Office Visit  2018 12:00p  Rheumatology  Bertrand NUÑEZ6.9  Rheumatoid



     [facility] M.D.    arthritis,



           unspecified









 R20.8  Other disturbances of skin sensation

 

 I73.00  Raynaud's syndrome without gangrene

 

 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

 

 Z79.899  Other long term (current) drug therapy

 

 Z23  Encounter for immunization









 Office Visit  2018  8:40a  Rheumatology  Bertrand NUÑEZ6.Charleen  Rheumatoid



     [facility] M.D.    arthritis,



           unspecified









 Z79.899  Other long term (current) drug therapy

 

 K21.9  Gastro-esophageal reflux disease without esophagitis

 

 M79.7  Fibromyalgia

 

 R79.82  Elevated C-reactive protein (CRP)

 

 I73.00  Raynaud's syndrome without gangrene









 Office Visit  2018  1:00p  Pulmonology And Sleep  Mima Putnam,  
R06.83  Snoring



     [facility]  MD    









 G47.00  Insomnia, unspecified

 

 G47.50  Parasomnia, unspecified

 

 E66.01  Morbid (severe) obesity due to excess calories

 

 Z68.43  Body mass index (BMI) 50-59.9 , adult









 Office Visit  2018  8:20a  Rheumatology  Bertrand NUÑEZ6.Charleen  Rheumatoid



     [facility] M.D.    arthritis,



           unspecified









 M79.7  Fibromyalgia

 

 R79.82  Elevated C-reactive protein (CRP)

 

 Z79.899  Other long term (current) drug therapy

 

 M54.5  Low back pain









 Office Visit  2018 10:20a  Rheumatology  Bertrand NUÑEZ6.9  Rheumatoid



     [facility] M.D.    arthritis,



           unspecified









 M79.7  Fibromyalgia

 

 R79.82  Elevated C-reactive protein (CRP)

 

 G47.00  Insomnia, unspecified

 

 R55  Syncope and collapse

 

 Z79.899  Other long term (current) drug therapy

 

 R63.5  Abnormal weight gain









 Office Visit  2018 10:40a  Rheumatology  Bertrand  M06.9  Rheumatoid



     [facility] M.D.    arthritis,



           unspecified









 M79.7  Fibromyalgia

 

 R79.82  Elevated C-reactive protein (CRP)

 

 R20.8  Other disturbances of skin sensation

 

 Z79.899  Other long term (current) drug therapy









 Office Visit  2018  9:20a  Rheumatology  Bertrand NUÑEZ6.9  Rheumatoid



     [facility] M.D.    arthritis,



           unspecified









 M79.7  Fibromyalgia

 

 R79.82  Elevated C-reactive protein (CRP)

 

 R20.8  Other disturbances of skin sensation

 

 Z79.899  Other long term (current) drug therapy

 

 Z23  Encounter for immunization









 Office Visit  10/30/2017  3:00p  Rheumatology  Bertrand  M06.9  Rheumatoid



     [facility] Wood M.D.    arthritis,



           unspecified









 M79.7  Fibromyalgia

 

 R79.82  Elevated C-reactive protein (CRP)

 

 M35.9  Systemic involvement of connective tissue, unspecified

 

 Z79.899  Other long term (current) drug therapy









 Office Visit  2017  4:43p  [facility]  ANNA Potter  
Syncope and



       MD    collapse

 

 Office Visit  2017 12:53p  [facility]  Raj  R55  Syncope 
and



     Hospitalists  Mareliseo-Karlene    collapse



       r, PA    









 M06.9  Rheumatoid arthritis, unspecified









 Office Visit  2017  4:41p  [facility]  ANNA Potter  
Syncope and



       MD    collapse

 

 Office Visit  2017 12:53p  [facility],ANNA Montoya  
Syncope and



     Hospitalists  N.P.    collapse









 M06.9  Rheumatoid arthritis, unspecified









 Office Visit  2017  4:40p  Rheumatology  Bertrand Nina,  M79.7  
Fibromyalgia



     [facility] PICHARDO.    









 M35.9  Systemic involvement of connective tissue, unspecified

 

 M54.5  Low back pain

 

 R79.82  Elevated C-reactive protein (CRP)

 

 Z79.899  Other long term (current) drug therapy









 Office Visit  2017  1:00p  Rheumatology  Bertrand  R79.82  Elevated



     [facility]  KATHERIN Wood    C-reactive



           protein (CRP)









 M79.7  Fibromyalgia

 

 M35.9  Systemic involvement of connective tissue, unspecified

 

 Z79.899  Other long term (current) drug therapy

 

 R53.83  Other fatigue

 

 F17.210  Nicotine dependence, cigarettes, uncomplicated







Plan of Treatment

Future Appointment(s):2019  9:45 am - Elisa Cronin DNP, RN, FNP-BC at 
[facility]2019  9:00 am - Bertrand Wood M.D. 
at [facility]2019  8:30 am - Modesto Cuba M.D. at 
[facility]2019 - Elisa Cronin DNP, RN, FNP-
BCG47.33 Obstructive sleep apnea (adult) (pediatric)New Orders:Sleep Study, 
Ordered: 19Comments:Severe apnea AHI 66.4/hourOn CPAP AHI 2.9/hourFollow 
up:1 month (after CPAP study)Recommendations:Continue PAP device, Benefitting 
and compliant with treatment.  Cleaning Wipe off mask daily (baby wipe-no scent
, or warm water) Clean mask, tubing, filter, and water chamber weekly in mild 
no scent dish soap and water. Hang to dry.    If you have any sleepiness while 
driving you MUST avoid operating a vehicle or machinery. If you have difficulty 
with your equipment, or need to replace your mask or hoses, please contact your 
homecare agency. A weight change of 20 pounds or more may have an effect onyour 
equipment; if you are experiencing problems please call for an appointment.  If 
you have any further questions, please call the [facility] at [phone]
[phone].F17.210 Nicotine dependence, cigarettes, uncomplicatedRecommendations:
Consider quitting support group Continue to reduce # per day Pick a quit 
dateG47.01 Insomnia due to medical conditionRecommendations:Keep sleep diary, 
based on perception (not the clock) Avoid caffeine Continue with sleep hygiene 
corrective behaviorsZ68.43 Body mass index (BMI) 50-59.9, adultRecommendations:
Avoid weight gain

## 2019-02-21 NOTE — ED
HPI Febrile Illness





- HPI Summary


HPI Summary: 





This patient is a 38 year old female brought in by ambulance to [facility] with a 

chief complaint of flu-like symptoms since 4 days ago. Patient states that she 

has had flu-like symptoms, including fever and headache. Today, she also 

developed nausea and vomiting, prompting ED visit. Patient notes that 2 

children at home are also flu positive and patient herself has several (10) 

autoimmune disorders. The pain is rated 9/10 in severity. Symptoms aggravated 

by nothing. Symptoms alleviated by nothing. Patient additionally reports 

rhinorrhea, weakness.  





- History of Current Complaint


Chief Complaint: EDFever


Time Seen by Provider: 19 19:46


Hx Obtained From: Patient


Onset/Duration: Started Days Ago - 4, Still Present


Timing: Constant


Initial Severity: Moderate


Current Severity: Severe


Pain Intensity: 9


Pain Scale Used: 0-10 Numeric


Aggravating Factors: Nothing


Alleviating Factors: Nothing


Associated Signs and Symptoms: Other: - rhinorrhea, weakness, headache, nausea, 

vomiting





- Additional Pertinent History


Primary Care Physician: CHAPARRITA





- Allergy/Home Medications


Allergies/Adverse Reactions: 


 Allergies











Allergy/AdvReac Type Severity Reaction Status Date / Time


 


azithromycin Allergy  Unknown Verified 10/08/18 09:57





   Reaction  





   Details  


 


bee venom protein (honey bee) Allergy  Anaphylatic Verified 10/08/18 09:57





   Shock  


 


cephalexin Allergy  Unknown Verified 10/08/18 09:57





   Reaction  





   Details  


 


clarithromycin Allergy  Difficulty Verified 10/08/18 09:57





   Breathing/  





   EASILY  





   BRUISED  


 


sarilumab [From Enriquezaaj] Allergy  Anaphylatic Verified 10/08/18 09:57





   Shock  


 


spider venom Allergy  Hives Verified 10/08/18 09:57


 


ORANGE DYE Allergy  HIVES,SOB Uncoded 10/08/18 09:57





   AND THROAT  





   SWELLS  














PMH/Surg Hx/FS Hx/Imm Hx


Previously Healthy: No


Endocrine/Hematology History: 


   Denies: Hx Diabetes


Cardiovascular History: Reports: Hx Syncope


   Denies: Hx Congestive Heart Failure, Hx Hypertension, Hx Pacemaker/ICD


Respiratory History: Reports: Hx Chronic Bronchitis


GI History: Reports: Hx Gastroesophageal Reflux Disease, Hx Ulcer


 History: Reports: Hx Kidney Stones


   Denies: Hx Renal Disease


Musculoskeletal History: Reports: Hx Arthritis, Hx Rheumatoid Arthritis, Hx 

Back Problems, Hx Fibromyalgia


Sensory History: 


   Denies: Hx Contacts or Glasses, Hx Hearing Aid


Opthamlomology History: 


   Denies: Hx Contacts or Glasses


Neurological History: Reports: Hx Headaches, Hx Migraine, Hx Nerve Disease


Psychiatric History: Reports: Hx Anxiety, Hx Depression, Hx Panic Disorder





- Cancer History


Hx Chemotherapy: No


Hx Radiation Therapy: No





- Surgical History


Surgery Procedure, Year, and Place: CHOLECYSTECTOMY, , TUBAL LIGATION


Hx Anesthesia Reactions: No


Infectious Disease History: 


   Denies: Traveled Outside the [place] in Last 30 Days





- Family History


Known Family History: Positive: Hypertension, Diabetes





- Social History


Lives: With Family


Alcohol Use: None


Hx Substance Use: No


Substance Use Type: Reports: None


Substance Use Comment - Amount & Last Used: Vicodin


Hx Tobacco Use: Yes


Smoking Status (MU): Light Every Day Tobacco Smoker





Review of Systems


Positive: Fever


Positive: Nasal Discharge


Positive: Vomiting, Nausea, Other - lack of appetite


Positive: Headache, Weakness


All Other Systems Reviewed And Are Negative: Yes





Physical Exam





- Summary


Physical Exam Summary: 





Appearance: Obese female, lying in bed, She flinches when touching any part of 

her body


Skin: Warm, dry, no obvious rash


Eyes: sclera anicteric, no conjunctival pallor


ENT: mucous membranes moist


Neck: deferred


Respiratory: Hyperventilating, does not appear hypoxic


Cardiovascular: Appears well perfused, pulses are nml


Abdomen: deferred


Musculoskeletal: Moving all 4 extremities without obvious discomfort


Neurological: Awake  and alert, mentation is normal, speech is fluent and 

appropriate


Psychiatric: affect is normal, does not appear anxious or depressed


Triage Information Reviewed: Yes


Vital Signs Reviewed: Yes





Diagnostics





- Laboratory


Result Diagrams: 


 19 21:35





 19 20:26


Lab Statement: Any lab studies that have been ordered have been reviewed, and 

results considered in the medical decision making process.





- Radiology


  ** CXR


Radiology Interpretation Completed By: ED Physician


Summary of Radiographic Findings: No acute process. Pending official imaging 

report.





Course/Dx





- Course


Course Of Treatment: This patient is a 38 year old female brought in by 

ambulance to [facility] with a chief complaint of flu-like symptoms since 4 days 

ago. Patient states that she has had flu-like symptoms, including fever and 

headache. Today, she also developed nausea and vomiting, prompting ED visit. 

Patient notes that 2 children at home are also flu positive and patient herself 

has several (10) autoimmune disorders. The pain is rated 9/10 in severity. 

Symptoms aggravated by nothing. Symptoms alleviated by nothing. Patient 

additionally reports rhinorrhea, weakness.  CXR revealed no acute process. In 

the ED course the patient was given Zofran IV and Morphine IV. Lab results 

obtained. The patient tested positive for influenza A. She will be discharged 

home and is agreeable with this plan.





- Diagnoses


Provider Diagnoses: 


 Influenza








Discharge





- Sign-Out/Discharge


Documenting (check all that apply): Patient Departure - DC


Patient Received Moderate/Deep Sedation with Procedure: No





- Discharge Plan


Condition: Good


Disposition: HOME


Patient Education Materials:  Influenza (ED)


Referrals: 


Aime ARIAS,Juan EUBANKS [Primary Care Provider] - 4 Days (if not improving, sooner 

if worse)





- Billing Disposition and Condition


Condition: GOOD


Disposition: Home





- Attestation Statements


Document Initiated by Bassem: Yes


Documenting Scribe: Earnest Menezes


Provider For Whom Bassem is Documenting (Include Credential): Jose Ramon Spicer MD


Scribshabbir Attestation: 


Earnest KOLB scrchazed for Jose Ramon Spicer MD on 19 at 2346. 


Scribe Documentation Reviewed: Yes


Provider Attestation: 


The documentation as recorded by the Earnest rowland accurately reflects 

the service I personally performed and the decisions made by me, Jose Ramon Spicer MD


Status of Scribshabbir Document: Viewed

## 2019-02-24 NOTE — PN
Progress Note





- Progress Note


Date of Service: 02/21/19


Note: 


Urine culture grew Escherichia coli


Patient was not placed on antibiotics prior to discharge


We will await sensitivities

## 2019-02-25 NOTE — PN
Progress Note





- Progress Note


Date of Service: 02/25/19


Note: 


Called patient at 9:30 AM on 2/25/19


Results of urine culture final grew Escherichia coli 100,000


Patient states she is allergic to Macrobid


She will be placed on Bactrim twice daily 5 days


Patient will  the pharmacy and nothing further is needed at this time.

## 2019-12-27 ENCOUNTER — HOSPITAL ENCOUNTER (EMERGENCY)
Dept: HOSPITAL 25 - ED | Age: 39
LOS: 1 days | Discharge: HOME | End: 2019-12-28
Payer: MEDICAID

## 2019-12-27 DIAGNOSIS — F41.9: ICD-10-CM

## 2019-12-27 DIAGNOSIS — F39: ICD-10-CM

## 2019-12-27 DIAGNOSIS — Z98.51: ICD-10-CM

## 2019-12-27 DIAGNOSIS — K21.9: ICD-10-CM

## 2019-12-27 DIAGNOSIS — F32.9: Primary | ICD-10-CM

## 2019-12-27 DIAGNOSIS — Z90.49: ICD-10-CM

## 2019-12-27 DIAGNOSIS — F17.200: ICD-10-CM

## 2019-12-27 PROCEDURE — 36415 COLL VENOUS BLD VENIPUNCTURE: CPT

## 2019-12-27 PROCEDURE — 99285 EMERGENCY DEPT VISIT HI MDM: CPT

## 2019-12-27 PROCEDURE — 85025 COMPLETE CBC W/AUTO DIFF WBC: CPT

## 2019-12-27 PROCEDURE — 80329 ANALGESICS NON-OPIOID 1 OR 2: CPT

## 2019-12-27 PROCEDURE — 80320 DRUG SCREEN QUANTALCOHOLS: CPT

## 2019-12-27 PROCEDURE — G0480 DRUG TEST DEF 1-7 CLASSES: HCPCS

## 2019-12-27 PROCEDURE — 80053 COMPREHEN METABOLIC PANEL: CPT

## 2019-12-27 PROCEDURE — 84443 ASSAY THYROID STIM HORMONE: CPT

## 2019-12-27 NOTE — ED
Medical Screening





- HPI Summary


HPI Summary: 





Patient brought 941 by PD after expressing suicidal ideation to crisis hotline 

representative.  Patient states her normal supporting was not available and she 

was just reaching out to talk things out.  Denies SI, HI at this time.  Patient 

alert and oriented, calm and cooperative.  Denies any other symptoms, pain or 

injury.  Denies EtOH or recreational drug use.





- History of Current Complaint


Chief Complaint: EDSuicidal


Stated Complaint: 941 PER POLICE


Time Seen by Provider: 19 23:33


Onset/Duration: Started Hours Ago


Severity: moderate





PMH/Surg Hx/FS Hx/Imm Hx


Endocrine/Hematology History: 


   Denies: Hx Diabetes


Cardiovascular History: Reports: Hx Syncope


   Denies: Hx Congestive Heart Failure, Hx Hypertension, Hx Pacemaker/ICD


Respiratory History: Reports: Hx Chronic Bronchitis


GI History: Reports: Hx Gastroesophageal Reflux Disease, Hx Ulcer


 History: Reports: Hx Kidney Stones, Hx Renal Disease - BORBERLINE


   Denies: Hx Dialysis


Musculoskeletal History: Reports: Hx Arthritis, Hx Rheumatoid Arthritis, Hx 

Back Problems, Hx Fibromyalgia


Sensory History: 


   Denies: Hx Contacts or Glasses, Hx Hearing Aid


Opthamlomology History: 


   Denies: Hx Contacts or Glasses


Neurological History: Reports: Hx Headaches, Hx Migraine, Hx Nerve Disease


Psychiatric History: Reports: Hx Anxiety, Hx Depression, Hx Panic Disorder





- Cancer History


Hx Chemotherapy: No


Hx Radiation Therapy: No





- Surgical History


Surgery Procedure, Year, and Place: CHOLECYSTECTOMY, , TUBAL LIGATION


Hx Anesthesia Reactions: No


Infectious Disease History: No


Infectious Disease History: 


   Denies: Traveled Outside the [place] in Last 30 Days





- Family History


Known Family History: Positive: Hypertension, Diabetes





- Social History


Alcohol Use: None


Hx Substance Use: No


Substance Use Type: Reports: None


Substance Use Comment - Amount & Last Used: Vicodin


Hx Tobacco Use: Yes


Smoking Status (MU): Current Every Day Smoker


Amount Used/How Often: 1/2 ppd





Review of Systems


Constitutional: Negative


Eyes: Negative


ENT: Negative


Cardiovascular: Negative


Respiratory: Negative


Gastrointestinal: Negative


Genitourinary: Negative


Musculoskeletal: Negative


Skin: Negative


Neurological: Negative


Positive: Anxious


All Other Systems Reviewed And Are Negative: Yes





Physical Exam





- Summary


Physical Exam Summary: 





Patient calm and cooperative.  Appropriate and coherent.  No apparent distress.


Triage Information Reviewed: Yes


Vital Signs On Initial Exam: 


 Initial Vitals











Temp Pulse Resp BP Pulse Ox


 


 96.7 F   75   16   156/91   99 


 


 19 23:23  19 23:23  19 23:23  19 23:23  19 23:23











Vital Signs Reviewed: Yes


Appearance: Positive: Well-Appearing


Skin: Positive: Warm


Head/Face: Positive: Normal Head/Face Inspection


Eyes: Positive: Normal


Neck: Positive: Supple


Respiratory/Lung Sounds: Positive: Clear to Auscultation


Cardiovascular: Positive: Normal


Abdomen Description: Positive: Nontender


Musculoskeletal: Positive: Normal


Neurological: Positive: Normal


Psychiatric: Positive: Normal


AVPU Assessment: Alert





- [place] Coma Scale


Best Eye Response: 4 - Spontaneous


Best Motor Response: 6 - Obeys Commands


Best Verbal Response: 5 - Oriented


Coma Scale Total: 15





Procedures





- Sedation


Patient Received Moderate/Deep Sedation with Procedure: No





Diagnostics





- Vital Signs


 Vital Signs











  Temp Pulse Resp BP Pulse Ox


 


 19 23:23  96.7 F  75  16  156/91  99














- Laboratory


Result Diagrams: 


 19 00:19





 19 00:19


Lab Statement: Any lab studies that have been ordered have been reviewed, and 

results considered in the medical decision making process.





Course/Dx





- Course


Course Of Treatment: Patient brought 941 by PD after expressing suicidal 

ideation to crisis hotline representative.  Patient states her normal 

supporting was not available and she was just reaching out to talk things out.  

Denies SI, HI at this time.  Patient alert and oriented, calm and cooperative.  

Denies any other symptoms, pain or injury.  Denies EtOH or recreational drug 

use.  Vital signs within normal limits.  WBC 13.1.  Creatinine 1.24, history of 

same.





Discharge ED





- Sign-Out/Discharge


Documenting (check all that apply): Sign-Out Patient


Signing out patient TO: Jose Ramon Spicer





- Discharge Plan


Referrals: 


Aime ARIAS,Juan EUBANKS [Primary Care Provider] -

## 2019-12-28 VITALS — SYSTOLIC BLOOD PRESSURE: 124 MMHG | DIASTOLIC BLOOD PRESSURE: 90 MMHG

## 2019-12-28 LAB
ALBUMIN SERPL BCG-MCNC: 3.9 G/DL (ref 3.2–5.2)
ALBUMIN/GLOB SERPL: 1.3 {RATIO} (ref 1–3)
ALP SERPL-CCNC: 68 U/L (ref 34–104)
ALT SERPL W P-5'-P-CCNC: 16 U/L (ref 7–52)
ANION GAP SERPL CALC-SCNC: 7 MMOL/L (ref 2–11)
APAP SERPL-MCNC: < 15 MCG/ML
AST SERPL-CCNC: 18 U/L (ref 13–39)
BASOPHILS # BLD AUTO: 0.1 10^3/UL (ref 0–0.2)
BUN SERPL-MCNC: 12 MG/DL (ref 6–24)
BUN/CREAT SERPL: 9.7 (ref 8–20)
CALCIUM SERPL-MCNC: 9.4 MG/DL (ref 8.6–10.3)
CHLORIDE SERPL-SCNC: 103 MMOL/L (ref 101–111)
EOSINOPHIL # BLD AUTO: 0.1 10^3/UL (ref 0–0.6)
GLOBULIN SER CALC-MCNC: 2.9 G/DL (ref 2–4)
GLUCOSE SERPL-MCNC: 101 MG/DL (ref 70–100)
HCO3 SERPL-SCNC: 27 MMOL/L (ref 22–32)
HCT VFR BLD AUTO: 38 % (ref 35–47)
HGB BLD-MCNC: 12.4 G/DL (ref 12–16)
LYMPHOCYTES # BLD AUTO: 3 10^3/UL (ref 1–4.8)
MCH RBC QN AUTO: 28 PG (ref 27–31)
MCHC RBC AUTO-ENTMCNC: 33 G/DL (ref 31–36)
MCV RBC AUTO: 85 FL (ref 80–97)
MONOCYTES # BLD AUTO: 0.8 10^3/UL (ref 0–0.8)
NEUTROPHILS # BLD AUTO: 9.1 10^3/UL (ref 1.5–7.7)
NRBC # BLD AUTO: 0 10^3/UL
NRBC BLD QL AUTO: 0
PLATELET # BLD AUTO: 181 10^3/UL (ref 150–450)
POTASSIUM SERPL-SCNC: 4 MMOL/L (ref 3.5–5)
PROT SERPL-MCNC: 6.8 G/DL (ref 6.4–8.9)
RBC # BLD AUTO: 4.4 10^6 /UL (ref 3.7–4.87)
SALICYLATES SERPL-MCNC: < 2.5 MG/DL (ref ?–30)
SODIUM SERPL-SCNC: 137 MMOL/L (ref 135–145)
TSH SERPL-ACNC: 5.09 MCIU/ML (ref 0.34–5.6)
WBC # BLD AUTO: 13.1 10^3/UL (ref 3.5–10.8)

## 2019-12-28 NOTE — ED
Progress





- Progress Note


Progress Note: 





Patient is a sign-out at 07:00 on 12/28/19 from Dr. Jose Ramon Spicer MD to Dr. Clementina Parra MD at shift change, pending mental health evaluation and 

disposition. 





At 11:29,  evaluator reports that the patients case was reviewed by Dr. Manuel who will discharge the patient with a diagnosis of mood disorder. 





- Consult/PCP


Time Called: 11:29


Consult/PCP: Bakari Manuel MD


Consult Reason/Comments: MHE





Course/Dx





- Course


Course Of Treatment: This pt is a sign out to Dr. Spicer from [facility] at 

shift change 0230 12/28/19 pending a MHE and disposition.  Per Dr. Manuel, 

psychiatrist, the pt will be on hold until she can be evaluated in the morning.

  This pt is a sign out to Dr. Parra from Dr. Spicer at shift change 0700 12 /28/19 pending a MHE and disposition.





- Diagnoses


Provider Diagnoses: 


 Depression, Mood disorder








- Provider Notifications


Discussed Care Of Patient With: Bakari Manuel - At 11:29,  evaluator reports 

that the patients case was reviewed by Dr. Manuel who will discharge the 

patient with a diagnosis of mood disorder. 


Time Discussed With Above Provider: 11:29


Instructed by Provider To: Other - Discharge





Discharge ED





- Sign-Out/Discharge


Documenting (check all that apply): Patient Departure - Discharge, Receiving 

Sign-Out


Receiving patient FROM: Jose Ramon Spicer - Patient is a sign-out at 07:00 on 12/28 /19 from Dr. Jose Ramon Spicer MD to Dr. Clementina Parra MD at shift change, 

pending mental health evaluation and disposition. 





- Discharge Plan


Condition: Stable


Disposition: HOME


Referrals: 


Aime ARIAS,Juan EUBANKS [Primary Care Provider] - 





- Attestation Statements


Document Initiated by Scribe: Yes


Documenting Scribe: Sarah Charlton


Provider For Whom Scribe is Documenting (Include Credential): Clementina Parra MD


Scribe Attestation: 


Sarah KOLB, scribed for Clementina Parra MD on 12/28/19 at 1130. 


Status of Scribe Document: Ready

## 2019-12-28 NOTE — XMS REPORT
Continuity of Care Document (CCD)

 Created on:2019



Patient:Manisha Nash

Sex:Female

:1980

External Reference #:[id number]





Demographics







 Address  [address] [place] [address]

 

 Home Phone  [phone]

 

 Mobile Phone  [phone]

 

 Preferred Language  en

 

 Marital Status  Not  or 

 

 [employer] Affiliation  Unknown

 

 Race  White

 

 Ethnic Group  Not  or 









Author







 Name  Bertrand Wood M.D. (transmitted by agent of provider Nory Stubbs)

 

 Address  [address] [place] [address]









Care Team Providers







 Name  Role  Phone

 

 Juan Salomon MD - Family Medicine  Care Team Information   [phone]
[phone]









Problems







 Active Problems  Provider  Date

 

 Obstructive sleep apnea syndrome  Elisa Cronin DNP, RN, FNP-BC  Onset: 2018

 

 Tobacco user  Elisa Cronin DNP, RN, FNP-BC  Onset: 2018

 

 Body mass index 40+ - severely  Elisa Cronin DNP, RN, FNP-BC  Onset: 2018



 obese    







Social History







 Type  Date  Description  Comments

 

 Birth Sex    Unknown  

 

 Tobacco Use  Start: Unknown  currently smokes 1/2  10-15 per day. 25



     Pack Daily  years. 2018,



       thinking about



       reducing or



       quitting.

 

 ETOH Use    Denies alcohol use  

 

 Recreational Drug Use    Denies Drug Use  

 

 Tobacco Use  Start: Unknown  Heavy tobacco smoker  10-15



     (more than 10  



     cigarettes/day)  

 

 Smoking Status  Reviewed: 19  Heavy tobacco smoker  10-15



     (more than 10  



     cigarettes/day)  

 

 Exercise Type/Frequency    Exercises regularly  Chases children

 

 Exercise Type/Frequency    Elliptical  2-3x per week







Allergies, Adverse Reactions, Alerts







 Active Allergies  Reaction  Severity  Comments  Date

 

 Clarithromycin  Bruising, Skin blotches, hives, SOB      2017

 

 [place] Dye      hives  10/30/2017

 

 Spider Bites  Target rash      2018

 

 Bee Sting  Anaphylaxis      2018

 

 Kevzara  Nausea and Vomiting, Breathing, chest    Biologic  2018



   heaviness, Chest, ear dulce, site react      

 

 Azithromycin  Skin blotches, hives, SOB      2018

 

 Cephalexin  Anaphylaxis      2018

 

 Celebrex  dyspnea and rash      2019







Medications







 Active Medications  SIG  Qnty  Indications  Ordering  Date



         Provider  

 

 Zyrtec Allergy  take one tablet by  30caps    [place]  



           10mg Capsules  mouth as needed for      KATHERIN Wood  9



   allergies        

 

 Hydrocodone-Acetaminophe  take one  14tabs  Z79.899  [place]  



 n  capsule/tablet by      KATHERIN Wood  9



 7.5-325mg Tablets  mouth twice daily        



   as needed for acute        



   pain        

 

 Methotrexate Sodium  inject 0.5  10ml    [place]  



                50mg/2ML  milliliters under      KATHERIN Wood



 Solution  the skin once a        



   week as directed        

 

 Folic Acid  take one  90tabs    [place]  



       1mg Tablets  capsule/tablet      KATHERIN Wood  9



   daily by mouth        

 

 BD 1ML Tuberculin  use for weekly  12units    [place]  



 Syringe/Safetyglide TB  injection of      KATHERIN Wood  9



 Needle 27GX1/2"  methotrexate        



            27G X 1/2" 1          



 ML Misc          

 

 Paroxetine HCL  take one tablet by  30tabs    [place]  



           20mg Tablets  mouth every day;      KATHERIN Wood



   stop cymbalta        

 

 Trazodone HCL  Take One Tablet By  30tabs    [place]  



          50mg Tablets  Mouth AT Bedtime as      KATHERIN Wood



   Needed For Insomnia        

 

 Pantoprazole Sodium  Take One Tablet By  30tabs    [place]  



                40mg  Mouth Every Day      KATHERIN Wood  8



 Tablets DR Boo/Bartolome/Aluminum  use daily to help  1units  M06.9  [place]  



 /Round Handle "  with ambulation      KATHERIN Wood  8



               " Misc  with a 4 point        



   prong        

 

 Morphine Sulfate ER  take one tablet by  30tabs    [place]  



                15mg  mouth at bedtime;      KATHERIN Wood  8



 Tablets ER  max daily dose = 1        



           

 

 Alprazolam  Take 1 Tablet By  14tabs    [place]  



       0.25mg Tablets  Mouth as Needed For      KATHERIN Wood



   Panic Attacks;        



   Maximum Daily Dose        



   = 1        

 

 Gabapentin  take one  90caps  M79.7  [place]  



       300mg Capsules  capsule/tab every 8      KATHERIN Wood  7



   hours (total of 3        



   per day)        

 

 Neoprene Patella Knee  use daily for the  2units  M25.561  [facility]  



 Support/Large  right knee for      KATHERIN Wood  7



           Misc  stabilization and        



   to prevent        



   hyperextension        

 

 Fexofenadine HCL  once daily      Unknown  



             180mg          0



 Tablets          

 

 Vitamin D3  1 by mouth every      Unknown  



       5000Unit Capsules  day        0



           

 

 Hydroxychloroquine  take two tablets  180tabs    Bertrand  



 Sulfate  once daily      KATHERIN Wood  0



    200mg Tablets          



           

 

 Proair HFA  2 puffs by mouth      Unknown  



       108(90Base)  every 4 hours as        0



 mcg/Act Aerosol  needed        



           

 

 Tums E-X 750  4 to 6 by mouth as      Unknown  



         750mg Chewtabs  needed        0



           

 

 Zicam Cold Remedy  as needed      Unknown  



               Tablets          0



 Dispers          

 

 Epipen 2-[place]  use as directed      Unknown  



         0.3mg/0.3ML          0



 Solution Auto-Inject          



           

 

 Ondansetron  Take 1 Tablet By      Unknown  



        4mg Tablets  Mouth Every 8 Hours        0



 Dispers  as Needed For        



   Nausea        

 

 Biotin        Unknown  



   1mg Capsules          0



           









 History Medications









 Celebrex  1 by mouth twice a day as  30caps  M06.09  Bertrand Wood,  2019 -



   needed for      M.D.  2019



 100mg Capsules  pain/inflammation. avoid        



   other nsaids        







Immunizations







 CPT Code  Status  Date  Vaccine  Reaction  Lot #

 

 [id number]  Given  2019  Pneumonia Vaccine  No immediate adverse  [id number]



         effects noted or  



         reported  

 

 87818  Given  2019  Influenza Virus Vaccine,  Patient denies ever  
[id number]



       Quadrivalent, Split,  having a serious  



       Preservative Free  reaction to eggs or  



         egg products, ever  



         having a serious  



         reaction or other  



         problem after getting  



         influenza vaccination,  



         ever being diagnosed  



         with  



         Gullain-Broadview-Syndrome  



         , possibility of being  



         pregnant, fever or  



         moderate/severe  



         illness today, allergy  



         to latex.  Patient  



         verifies "I have read  



         or had explained to me  



         the information about  



         influenza and  



         influenza vaccine.  I  



         have had a chance to  



         ask questions that  



         were answered to my  



         satisfaction.  I  



         believe I understand  



         the benefits and risks  



         of influenza vaccine  



         and ask that the  



         vaccine be given to  



         me." Patient tolerated  



         injection w/ no  



         immediate adverse  



         effect.  

 

 78706  Given  2018  Influenza Virus Vaccine,  no immediate reaction  
[id number]



       Quadrivalent, Split,    



       Preservative Free    

 

 26619  Given  2018  Pneumococcal Conjugate  no immediate reaction  [id number]



       Vaccine 13 Valent For  notedt  



       Intramuscular Use    







Vital Signs







 Date  Vital  Result  Comment

 

 2019 10:46am  Height  68 inches  5'8"









 Weight  339.12 lb  

 

 Heart Rate  82 /min  

 

 BP Systolic  130 mmHg  

 

 BP Diastolic  82 mmHg  

 

 Body Temperature  97.1 F  

 

 Pain Level  6  

 

 O2 % BldC Oximetry  98 %  

 

 BMI (Body Mass Index)  51.6 kg/m2  









 2019 11:18am  Height  68 inches  5'8"









 Weight  337.00 lb  

 

 Heart Rate  82 /min  

 

 BP Systolic Sitting  130 mmHg  

 

 BP Diastolic Sitting  84 mmHg  

 

 Respiratory Rate  16 /min  

 

 Body Temperature  97.4 F  

 

 BMI (Body Mass Index)  51.2 kg/m2  







Results







 Test  Acquired  Facility  Test  Result  H/L  Range  Note



   Date            

 

 Drug  2019  [facility]  Urine  Presumptive  Abnormal  None  1



 Screen    [address]  Hydrocodone  Posi <SEE    Detect  



 [place] [address]  Screen  NOTE>      



 Clinic    [phone]          









 Urine Oxycodone Screen  None Detected    None Detect  

 

 Urine Fentanyl Screen  None Detected    None Detect  

 

 Urine Methadone Screen  None Detected    None Detect  

 

 Urine Buprenorphine Screen  None Detected    None Detect  

 

 Urine Amphetamine Screen  None Detected    None Detect  

 

 Urine Barbiturates Screen  None Detected    None Detect  

 

 Urine Benzodiazepine Screen  None Detected    None Detect  

 

 Urine Cannabinoids Screen  None Detected    None Detect  

 

 Urine Cocaine Screen  None Detected    None Detect  

 

 Urine Opiates Screen  Presumptive Posi <SEE NOTE>  Abnormal  None Detect  2

 

 Urine Phencyclidine Screen  None Detected    None Detect  3









 Laboratory test  10/22/2019  [facility]  Erythrocyte Sed  17 mm/Hr  
Normal  0-19  



 finding    [address]  Rate        



     [place] [address] [phone]          









 C Reactive Protein  7.46 mg/L  Normal  <8.01  









 CBC Auto  10/22/2019  [facility]  White Blood  8.3 10^3/uL  Normal  
3.5-10.8  



 Diff    [address]  Count        



     [place] [address] [phone]          









 Red Blood Count  4.37 10^6/uL  Normal  3.70-4.87  

 

 Hemoglobin  12.7 g/dL  Normal  12.0-16.0  

 

 Hematocrit  38 %  Normal  35-47  

 

 Mean Corpuscular Volume  87 fL  Normal  80-97  

 

 Mean Corpuscular Hemoglobin  29 pg  Normal  27-31  

 

 Mean Corpuscular HGB Conc  33 g/dL  Normal  31-36  

 

 Red Cell Distribution Width  16 %  High  10-15  

 

 Platelet Count  176 10^3/uL  Normal  150-450  

 

 Mean Platelet Volume  9.3 fL  Normal  7.4-10.4  

 

 Abs Neutrophils  5.7 10^3/uL  Normal  1.5-7.7  

 

 Abs Lymphocytes  1.9 10^3/uL  Normal  1.0-4.8  

 

 Abs Monocytes  0.6 10^3/uL  Normal  0-0.8  

 

 Abs Eosinophils  0.1 10^3/uL  Normal  0-0.6  

 

 Abs Basophils  0.0 10^3/uL  Normal  0-0.2  

 

 Abs Nucleated RBC  0.0 10^3/uL      

 

 Granulocyte %  69.1 %      

 

 Lymphocyte %  22.8 %      

 

 Monocyte %  7.2 %      

 

 Eosinophil %  0.6 %      

 

 Basophil %  0.3 %      

 

 Nucleated Red Blood Cells %  0.2      









 Comp Metabolic  10/22/2019  [facility]  Sodium  138 mmol/L  Normal  
135-145  



 Panel    [address] [place] [address] [phone]          









 Potassium  3.7 mmol/L  Normal  3.5-5.0  

 

 Chloride  104 mmol/L  Normal  101-111  

 

 Co2 Carbon Dioxide  26 mmol/L  Normal  22-32  

 

 Anion Gap  8 mmol/L  Normal  2-11  

 

 Glucose  79 mg/dL  Normal    

 

 Blood Urea Nitrogen  10 mg/dL  Normal  6-24  

 

 Creatinine  1.14 mg/dL  High  0.51-0.95  

 

 BUN/Creatinine Ratio  8.8  Normal  8-20  

 

 Calcium  9.4 mg/dL  Normal  8.6-10.3  

 

 Total Protein  6.6 g/dL  Normal  6.4-8.9  

 

 Albumin  4.0 g/dL  Normal  3.2-5.2  

 

 Globulin  2.6 g/dL  Normal  2-4  

 

 Albumin/Globulin Ratio  1.5  Normal  1-3  

 

 Total Bilirubin  0.70 mg/dL  Normal  0.2-1.0  

 

 Alkaline Phosphatase  62 U/L  Normal    

 

 Alt  25 U/L  Normal  7-52  

 

 Ast  26 U/L  Normal  13-39  

 

 Egfr Non-  52.8    >60  

 

 Egfr   63.9    >60  4









 Comp Metabolic  2019  [facility]  Sodium  140 mmol/L  Normal  
135-145  



 Panel    [address] [place] [address] [phone]          









 Potassium  4.1 mmol/L  Normal  3.5-5.0  

 

 Chloride  107 mmol/L  Normal  101-111  

 

 Co2 Carbon Dioxide  30 mmol/L  Normal  22-32  

 

 Anion Gap  3 mmol/L  Normal  2-11  

 

 Calcium  8.4 mg/dL  Low  8.6-10.3  

 

 Albumin  3.5 g/dL  Normal  3.2-5.2  

 

 Total Bilirubin  0.50 mg/dL  Normal  0.2-1.0  

 

 Glucose  91 mg/dL  Normal    

 

 Blood Urea Nitrogen  13 mg/dL  Normal  6-24  

 

 Creatinine  1.14 mg/dL  High  0.51-0.95  

 

 BUN/Creatinine Ratio  11.4  Normal  8-20  

 

 Total Protein  5.4 g/dL  Low  6.4-8.9  

 

 Globulin  1.9 g/dL  Low  2-4  

 

 Albumin/Globulin Ratio  1.8  Normal  1-3  

 

 Alkaline Phosphatase  57 U/L  Normal    

 

 Alt  21 U/L  Normal  7-52  

 

 Ast  26 U/L  Normal  13-39  

 

 Egfr Non-  52.8    >60  

 

 Egfr   63.9    >60  5









 CBC Auto  2019  [facility]  White Blood  7.7 10^3/uL  Normal  
3.5-10.8  



 Diff    [address]  Count        



     [place] [address] [phone]          









 Red Blood Count  4.03 10^6/uL  Normal  3.70-4.87  

 

 Hemoglobin  12.1 g/dL  Normal  12.0-16.0  

 

 Hematocrit  35 %  Normal  35-47  

 

 Mean Corpuscular Volume  87 fL  Normal  80-97  

 

 Mean Corpuscular Hemoglobin  30 pg  Normal  27-31  

 

 Mean Corpuscular HGB Conc  34 g/dL  Normal  31-36  

 

 Red Cell Distribution Width  16 %  High  10-15  

 

 Platelet Count  173 10^3/uL  Normal  150-450  

 

 Mean Platelet Volume  9.8 fL  Normal  7.4-10.4  

 

 Abs Neutrophils  4.3 10^3/uL  Normal  1.5-7.7  

 

 Abs Lymphocytes  2.8 10^3/uL  Normal  1.0-4.8  

 

 Abs Monocytes  0.5 10^3/uL  Normal  0-0.8  

 

 Abs Eosinophils  0.1 10^3/uL  Normal  0-0.6  

 

 Abs Basophils  0.0 10^3/uL  Normal  0-0.2  

 

 Abs Nucleated RBC  0.0 10^3/uL      

 

 Granulocyte %  55.2 %      

 

 Lymphocyte %  36.1 %      

 

 Monocyte %  7.0 %      

 

 Eosinophil %  1.3 %      

 

 Basophil %  0.4 %      

 

 Nucleated Red Blood Cells %  0.0      









 Laboratory test  2019  [facility]  C Reactive  8.09 mg/L  
High  <8.01  



 finding    [address]  Protein        



     [place] [address] [phone]          









 Erythrocyte Sed Rate  15 mm/Hr  Normal  0-19  









 Laboratory test  2019  [facility]  Erythrocyte Sed  3 mm/Hr  
Normal  0-19  6



 finding    [address]  Rate        



     [place] [address] [phone]          









 C Reactive Protein  < 1.00 mg/L  Normal  <8.01  7









 CBC Auto  2019  [facility]  White Blood  8.9 10^3/uL  Normal  
3.5-10.8  



 Diff    [address]  Count        



     [place] [address] [phone]          









 Red Blood Count  4.85 10^6/uL  Normal  3.70-4.87  

 

 Hemoglobin  14.4 g/dL  Normal  12.0-16.0  

 

 Hematocrit  42 %  Normal  35-47  

 

 Mean Corpuscular Volume  87 fL  Normal  80-97  

 

 Mean Corpuscular Hemoglobin  30 pg  Normal  27-31  

 

 Mean Corpuscular HGB Conc  34 g/dL  Normal  31-36  

 

 Red Cell Distribution Width  17 %  High  10-15  

 

 Platelet Count  157 10^3/uL  Normal  150-450  

 

 Mean Platelet Volume  9.7 fL  Normal  7.4-10.4  

 

 Abs Neutrophils  5.4 10^3/uL  Normal  1.5-7.7  

 

 Abs Lymphocytes  2.8 10^3/uL  Normal  1.0-4.8  

 

 Abs Monocytes  0.5 10^3/uL  Normal  0-0.8  

 

 Abs Eosinophils  0.1 10^3/uL  Normal  0-0.6  

 

 Abs Basophils  0.1 10^3/uL  Normal  0-0.2  

 

 Abs Nucleated RBC  0.0 10^3/uL      

 

 Granulocyte %  60.2 %      

 

 Lymphocyte %  31.9 %      

 

 Monocyte %  5.9 %      

 

 Eosinophil %  1.4 %      

 

 Basophil %  0.6 %      

 

 Nucleated Red Blood Cells %  0.0      









 Comp Metabolic  2019  [facility]  Sodium  139 mmol/L  Normal  
135-145  



 Panel    [address]          



     [place] [address] [phone]          









 Potassium  4.4 mmol/L  Normal  3.5-5.0  

 

 Chloride  107 mmol/L  Normal  101-111  

 

 Co2 Carbon Dioxide  25 mmol/L  Normal  22-32  

 

 Anion Gap  7 mmol/L  Normal  2-11  

 

 Glucose  82 mg/dL  Normal    

 

 Blood Urea Nitrogen  12 mg/dL  Normal  6-24  

 

 Creatinine  1.14 mg/dL  High  0.51-0.95  

 

 BUN/Creatinine Ratio  10.5  Normal  8-20  

 

 Calcium  9.2 mg/dL  Normal  8.6-10.3  

 

 Total Protein  6.4 g/dL  Normal  6.4-8.9  

 

 Albumin  4.4 g/dL  Normal  3.2-5.2  

 

 Globulin  2.0 g/dL  Normal  2-4  

 

 Albumin/Globulin Ratio  2.2  Normal  1-3  

 

 Total Bilirubin  0.60 mg/dL  Normal  0.2-1.0  

 

 Alkaline Phosphatase  64 U/L  Normal    

 

 Alt  38 U/L  Normal  7-52  

 

 Ast  33 U/L  Normal  13-39  

 

 Egfr Non-  52.8    >60  

 

 Egfr   63.9    >60  8









 Laboratory  2019  [facility]  TSH (Thyroid  2.16  Normal  0.34
-5.60  9



 test finding    [address] [facility]  Stim Horm)  mcIU/mL      



     [place] [address] [phone]          

 

 Urinalysis  2019  [facility]  Urine Color  Yellow      



 Profile    [facility] [place] [address] [phone]          









 Urine Appearance  Cloudy      

 

 Urine Specific Gravity  1.006  Low  1.010-1.030  

 

 Urine pH  7.0  Normal  5-9  

 

 Urine Urobilinogen  Negative    Negative  

 

 Urine Ketones  Negative    Negative  

 

 Urine Protein  Negative    Negative  

 

 Urine Leukocytes  Negative    Negative  

 

 Urine Blood  Negative    Negative  

 

 Urine Nitrite  Negative    Negative  

 

 Urine Bilirubin  Negative    Negative  

 

 Urine Glucose  Negative    Negative  









 Drug Abuse 20  2019  [facility]  Urine Amphetamine  Negative 
ng/mL      10



 Urine    [facility] [place] [address] [phone]          









 Urine Barbiturates  Negative ng/mL      11

 

 Urine Benzodiazepines  Negative ng/mL      12

 

 Urine Cocaine  Negative ng/mL      13

 

 Urine Phencyclidine  Negative ng/mL    Cutoff: 25  

 

 Urine Tetrahydrocannabinol  Negative ng/mL    Cutoff: 50  14

 

 Creatinine, Urine  40.6 mg/dL      

 

 Specific Gravity  1.004      

 

 pH  7.4      

 

 Oxidants  Negative      15

 

 Adulterants Comment  Normal      

 

 Codeine, Ur  Not Detected ng/mL    Cutoff: 25  16

 

 Codeine-6-beta-glucuronide, Ur  Not Detected ng/mL      17

 

 Morphine, Ur  Not Detected ng/mL    Cutoff: 25  18

 

 Morphine-6-beta-glucuronide, U  Not Detected ng/mL      19

 

 6-monoacetylmorphine, Ur  Not Detected ng/mL    Cutoff: 25  20

 

 Hydrocodone, Ur  Present ng/mL  Abnormal  Cutoff: 25  21

 

 Norhydrocodone, Ur  Present ng/mL  Abnormal  Cutoff: 25  22

 

 Dihydrocodeine, Ur  Present ng/mL  Abnormal  Cutoff: 25  23

 

 Hydromorphone, Ur  Not Detected ng/mL    Cutoff: 25  24

 

 [id number]  Not Detected ng/mL      25

 

 Oxycodone, Ur  Not Detected ng/mL    Cutoff: 25  26

 

 Noroxycodone, Ur  Not Detected ng/mL    Cutoff: 25  27

 

 Oxymorphone, Ur  Not Detected ng/mL    Cutoff: 25  28

 

 Oxymorphone-3-beta-glucuronide  Not Detected ng/mL      29

 

 Noroxymorphone, Ur  Not Detected ng/mL    Cutoff: 25  30

 

 Fentanyl, Ur  Not Detected ng/mL    Cutoff: 2  31

 

 Norfentanyl, Ur  Not Detected ng/mL    Cutoff: 2  32

 

 Meperidine, Ur  Not Detected ng/mL    Cutoff: 25  33

 

 Normeperidine, Ur  Not Detected ng/mL    Cutoff: 25  34

 

 Naloxone, Ur  Not Detected ng/mL    Cutoff: 25  35

 

 Naloxone-3-beta-glucuronide, U  Not Detected ng/mL      36

 

 Methadone, Ur  Not Detected ng/mL    Cutoff: 25  37

 

 Eddp, Ur  Not Detected ng/mL    Cutoff: 25  38

 

 Propoxyphene, Ur  Not Detected ng/mL    Cutoff: 25  39

 

 Norpropoxyphene, Ur  Not Detected ng/mL    Cutoff: 25  40

 

 Tramadol, Ur  Not Detected ng/mL    Cutoff: 25  41

 

 O-desmethyltramadol, Ur  Not Detected ng/mL    Cutoff: 25  42

 

 Tapentadol, Ur  Not Detected ng/mL    Cutoff: 25  43

 

 N-desmethyltapentadol, Ur  Not Detected ng/mL    Cutoff: 50  44

 

 Tapentadol-beta-glucuronide, U  Not Detected ng/mL      45

 

 Buprenorphine, Ur  Not Detected ng/mL    Cutoff: 5  46

 

 Norbuprenorphine, Ur  Not Detected ng/mL    Cutoff: 5  47

 

 Norbuprenorphine glucuronide  Not Detected ng/mL    Cutoff: 20  48

 

 Opioid Interpretation  See Comment      49









 1  Presumptive Positive



   Presumptive positive results are unconfirmed.

 

 2  Presumptive Positive



   Presumptive positive results are unconfirmed.

 

 3  -----------------------------------------------------------



   The specimen was tested at the listed cutoffs:



   -----------------------------------------------------------



   Drug Class                       Test level (ng/mL)



   -----------                      ------------------



   Hydrocodone                         300



   Oxycodone                           100



   Fentanyl                              1



   Methadone                           150



   Buprenorphine                         5



   Amphetamines                        500



   Barbiturates                        200



   Benzodiazepines                     200



   Cocaine                             150



   Cannabinoids                         50



   Opiates                             300



   PCP                                  25



   



   **Specimen was received without chain of custody. Results



   should be used for medical purposes only.**

 

 4  *******Because ethnic data is not always readily available,



   this report includes an eGFR for both -Americans and



   non- Americans.****



   The National Kidney Disease Education Program (NKDEP) does



   not endorse the use of the MDRD equation for patients that



   are not between the ages of 18 and 70, are pregnant, have



   extremes of body size, muscle mass, or nutritional status,



   or are non- or non-.



   According to the National Kidney Foundation, irrespective of



   diagnosis, the stage of the disease is based on the level of



   kidney function:



   Stage Description                      GFR(mL/min/1.73 m(2))



   1     Kidney damage with normal or decreased GFR       90



   2     Kidney damage with mild decrease in GFR          60-89



   3     Moderate decrease in GFR                         30-59



   4     Severe decrease in GFR                           15-29



   5     Kidney failure                       <15 (or dialysis)

 

 5  *******Because ethnic data is not always readily available,



   this report includes an eGFR for both -Americans and



   non- Americans.****



   The National Kidney Disease Education Program (NKDEP) does



   not endorse the use of the MDRD equation for patients that



   are not between the ages of 18 and 70, are pregnant, have



   extremes of body size, muscle mass, or nutritional status,



   or are non- or non-.



   According to the National Kidney Foundation, irrespective of



   diagnosis, the stage of the disease is based on the level of



   kidney function:



   Stage Description                      GFR(mL/min/1.73 m(2))



   1     Kidney damage with normal or decreased GFR       90



   2     Kidney damage with mild decrease in GFR          60-89



   3     Moderate decrease in GFR                         30-59



   4     Severe decrease in GFR                           15-29



   5     Kidney failure                       <15 (or dialysis)

 

 6  Please check labs today

 

 7  Please check labs today

 

 8  *******Because ethnic data is not always readily available,



   this report includes an eGFR for both -Americans and



   non- Americans.****



   The National Kidney Disease Education Program (NKDEP) does



   not endorse the use of the MDRD equation for patients that



   are not between the ages of 18 and 70, are pregnant, have



   extremes of body size, muscle mass, or nutritional status,



   or are non- or non-.



   According to the National Kidney Foundation, irrespective of



   diagnosis, the stage of the disease is based on the level of



   kidney function:



   Stage Description                      GFR(mL/min/1.73 m(2))



   1     Kidney damage with normal or decreased GFR       90



   2     Kidney damage with mild decrease in GFR          60-89



   3     Moderate decrease in GFR                         30-59



   4     Severe decrease in GFR                           15-29



   5     Kidney failure                       <15 (or dialysis)

 

 9  Please check labs today

 

 10  -------------------REFERENCE VALUE--------------------------



   Cutoff: 500

 

 11  -------------------REFERENCE VALUE--------------------------



   Cutoff: 200

 

 12  -------------------REFERENCE VALUE--------------------------



   Cutoff: 100

 

 13  -------------------REFERENCE VALUE--------------------------



   Cutoff: 150

 

 14  -------------------ADDITIONAL INFORMATION-------------------



   This report is intended for use in clinical monitoring or



   management of patients.  It is not intended for use in



   employment-related testing.



   Test Performed by:



   [facility] [employer] [facility]



   [address], [place] [address]

 

 15  -------------------REFERENCE VALUE--------------------------



   Cutoff: 200 mg/L

 

 16  Tylenol 3

 

 17  Metabolite of codeine



   -------------------REFERENCE VALUE--------------------------



   Cutoff: 100

 

 18  Barbara Montoya, MS Contin; Also a minor metabolite (10%) of



   codeine and can be seen in low concentrations (<2,000



   ng/mL) with poppy seed ingestion.

 

 19  Metabolite of morphine



   -------------------REFERENCE VALUE--------------------------



   Cutoff: 100

 

 20  Metabolite of heroin

 

 21  Lortab, Norco, Vicodin; Also a very minor metabolite of



   codeine and impurity (<1%) of oxycodone.

 

 22  Metabolite of hydrocodone

 

 23  Metabolite of hydrocodone

 

 24  Dilaudid, Exalgo; Also a metabolite of hydrocodone and a



   minor (<5%) metabolite of morphine.

 

 25  Metabolite of hydromorphone



   -------------------REFERENCE VALUE--------------------------



   Cutoff: 100

 

 26  Endocet, Percocet, Oxycontin

 

 27  Metabolite of oxycodone

 

 28  Numorphan, Opana; Also a metabolite of oxycodone.

 

 29  Metabolite of oxymorphone



   -------------------REFERENCE VALUE--------------------------



   Cutoff: 100

 

 30  Metabolite of oxymorphone

 

 31  Actiq, Duragesic, [place]

 

 32  Metabolite of fentanyl

 

 33  Demerol

 

 34  Metabolite of meperidine

 

 35  Narcan

 

 36  Metabolite of naloxone



   -------------------REFERENCE VALUE--------------------------



   Cutoff: 100

 

 37  Dolophine

 

 38  Metabolite of methadone

 

 39  Darvon, Darvocet

 

 40  Metabolite of propoxyphene

 

 41  Tradol, Ultram, Ultracet

 

 42  Metabolite of tramadol

 

 43  Nucynta

 

 44  Metabolite of tapentadol

 

 45  Metabolite of tapentadol



   -------------------REFERENCE VALUE--------------------------



   Cutoff: 100

 

 46  Buprenex, Suboxone

 

 47  Metabolite of buprenorphine

 

 48  Metabolite of buprenorphine

 

 49  Test detected the presence of hydrocodone and its



   metabolites (norhydrocodone and dihydrocodeine). Suspect



   use of hydrocodone within the past three days.



   -------------------ADDITIONAL INFORMATION-------------------



   This test was developed and its performance characteristics



   determined by [facility] in a manner consistent with CLIA



   requirements. This test has not been cleared or approved by



   the [place]. Food and Drug Administration.







Procedures







 Date  Code  Description  Status

 

 2019  99086  Nerve Conduction 11-12 Studies  Completed

 

 2019  28035  Needle Electromyography Complete, Five Or More Muscles  
Completed



     Studied  

 

 10/04/2018  [id number]  Mammogram  Completed







[employer]







 Description

 

 No Information Available







Encounters







 Type  Date  Location  Provider  Dx  Diagnosis

 

 Office Visit  2019  Rheumatology  Bertrand Wood  M06.09  Rheumatoid



   10:40a  [facility]    arthritis w/o



           rheumatoid



           factor, multiple



           sites









 M54.16  Radiculopathy, lumbar region

 

 Z79.899  Other long term (current) drug therapy

 

 M79.7  Fibromyalgia

 

 Z23  Encounter for immunization









 Office Visit  2019 10:40a  Rheumatology  Bertrand  M06.09  Rheumatoid



     [facility] M[facility]    arthritis w/o



           rheumatoid



           factor, multiple



           sites









 Z79.899  Other long term (current) drug therapy

 

 M79.7  Fibromyalgia

 

 M51.36  Other intervertebral disc degeneration, lumbar region

 

 R20.8  Other disturbances of skin sensation









 Office Visit  2019  9:40a  Rheumatology  Bertrand  M06.09  Rheumatoid



     [facility]    arthritis w/o



           rheumatoid



           factor, multiple



           sites









 Z79.899  Other long term (current) drug therapy

 

 M79.7  Fibromyalgia

 

 M51.36  Other intervertebral disc degeneration, lumbar region







Assessments







 Date  Code  Description  Provider

 

 2019  M06.09  Rheumatoid arthritis without rheumatoid  KATHERIN Sabillon, multiple sit  

 

 2019  M54.16  Radiculopathy, lumbar region  Bertrand Wood M.D.

 

 2019  Z79.899  Other long term (current) drug therapy  Bertrand Wood M.D.

 

 2019  M79.7  Fibromyalgia  Bertrand Wood M.D.

 

 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

 

 2019  M06.09  Rheumatoid arthritis without rheumatoid  KATHERIN Sabillon, multiple sit  

 

 2019  M54.16  Radiculopathy, lumbar region  Bertrand Wood M.D.

 

 2019  Z79.899  Other long term (current) drug therapy  Bertrand Wood M.D.

 

 2019  M79.7  Fibromyalgia  Bertrand Wood M.D.

 

 2019  Z23  Encounter for immunization  Bertrand Wood M.D.

 

 2019  M54.16  Radiculopathy, lumbar region  Juan Davis M.D.

 

 2019  M79.641  Pain in right hand  Juan Davis M.D.

 

 2019  M79.642  Pain in left hand  Juan Davis M.D.

 

 2019  M06.09  Rheumatoid arthritis without rheumatoid  KATHERIN Sabillon, multiple sit  

 

 2019  Z79.899  Other long term (current) drug therapy  Bertrand Wood M.D.

 

 2019  M79.7  Fibromyalgia  Bertrand Wood M.D.

 

 2019  M51.36  Other intervertebral disc degeneration,  Bertrand Wood M.D.



     lumbar region  

 

 2019  R20.8  Other disturbances of skin sensation  Bertrand Wood M.D.

 

 2019  M06.09  Rheumatoid arthritis without rheumatoid  Bertrand Wood M.D.



     factor, multiple sit  

 

 2019  Z79.899  Other long term (current) drug therapy  Bertrand Wood M.D.

 

 2019  M79.7  Fibromyalgia  Bertrand Wood M.D.

 

 2019  M51.36  Other intervertebral disc degeneration,  Bertrand Wood M.D.



     lumbar region  







Plan of Treatment

Future Appointment(s):2020 11:20 am - Bertrand Wood M.D. at [facility] 
[facility]2020  1:20 pm - Bertrand Wood M.D. at Rheumatology 
[facility]2019 - Bertrand Wood M.D.M06.09 Rheumatoid arthritis 
without rheumatoid factor, multiple sitM54.16 Radiculopathy, lumbar regionNew 
Therapy:Physical TherapyFollow up:Follow up in 2 months or sooner if 
wyfcyqQ09.899 Other long term (current) drug yxavixeH94.7 [id number] 
Livedoid vasculitis



Functional Status







 Description

 

 No Information Available







Mental Status







 Description

 

 No Information Available







Referrals







 Description

 

 No Information Available

## 2019-12-28 NOTE — ED
Progress





- Progress Note


Progress Note: 





This pt is a sign out to Dr. Spicer from [facility] at shift change 0230 12/ 28/19 pending a MHE and disposition. 





Course/Dx





- Course


Course Of Treatment: This pt is a sign out to Dr. Spicer from [facility] at 

shift change 0230 12/28/19 pending a MHE and disposition.  Per Dr. Manuel, 

psychiatrist, the pt will be on hold until she can be evaluated in the morning.

  This pt is a sign out to Dr. Parra from Dr. Spicer at shift change 0700 12 /28/19 pending a MHE and disposition.





- Diagnoses


Provider Diagnoses: 


 Depression, Mood disorder








- Provider Notifications


Discussed Care Of Patient With: Bakari Manuel


Time Discussed With Above Provider: 03:53


Instructed by Provider To: MD Will See In ED





Discharge ED





- Sign-Out/Discharge


Documenting (check all that apply): Sign-Out Patient, Receiving Sign-Out


Signing out patient TO: Clementina Parra


Receiving patient FROM: Nasim Ribe[facility]





- Discharge Plan


Condition: Stable


Disposition: HOME


Referrals: 


Aime ARIAS,Juan EUBANKS [Primary Care Provider] - 





- Billing Disposition and Condition


Condition: STABLE


Disposition: Home





- Attestation Statements


Document Initiated by Scribe: Yes


Documenting Scribe: Ivan Dixon


Provider For Whom Bassem is Documenting (Include Credential): Jose Ramon Spicer MD 


Scribshabbir Attestation: 


Ivan KOLB, scribed for Jose Ramon Spicer MD  on 12/28/19 at 2318. 


Scribe Documentation Reviewed: Yes


Provider Attestation: 


The documentation as recorded by the Ivan rowland accurately reflects 

the service I personally performed and the decisions made by me, Jose Ramon Spicer MD 


Status of Scribe Document: Viewed

## 2019-12-28 NOTE — XMS REPORT
Continuity of Care Document (CCD)

 Created on:2019



Patient:Manisha Nash

Sex:Female

:1980

External Reference #:[id number]





Demographics







 Address  [address] [place] [address]

 

 Home Phone  [phone]

 

 Mobile Phone  [phone]

 

 Preferred Language  en

 

 Marital Status  Not  or 

 

 Hoahaoism Affiliation  Unknown

 

 Race  White

 

 Ethnic Group  Not  or 









Author







 Name  Bertrand Wood M.D. (transmitted by agent of provider Chrissy Hernandez)

 

 Address  [address] [place] [id number]









Care Team Providers







 Name  Role  Phone

 

 Juan Salomon MD - Family Medicine  Care Team Information   [phone]
[phone]









Problems







 Active Problems  Provider  Date

 

 Obstructive sleep apnea syndrome  Elisa Cronin DNP, RN, FNP-BC  Onset: 2018

 

 Tobacco user  Elisa Cronin DNP, RN, FNP-BC  Onset: 2018

 

 Body mass index 40+ - severely  Elisa Cronin DNP, RN, FNP-BC  Onset: 2018



 obese    







Social History







 Type  Date  Description  Comments

 

 Birth Sex    Unknown  

 

 Tobacco Use  Start: Unknown  currently smokes 1/2  10-15 per day. 25



     Pack Daily  years. 2018,



       thinking about



       reducing or



       quitting.

 

 ETOH Use    Denies alcohol use  

 

 Recreational Drug Use    Denies Drug Use  

 

 Tobacco Use  Start: Unknown  Heavy tobacco smoker  10-15



     (more than 10  



     cigarettes/day)  

 

 Smoking Status  Reviewed: 19  Heavy tobacco smoker  10-15



     (more than 10  



     cigarettes/day)  

 

 Exercise Type/Frequency    Exercises regularly  Chases children

 

 Exercise Type/Frequency    Elliptical  2-3x per week







Allergies, Adverse Reactions, Alerts







 Active Allergies  Reaction  Severity  Comments  Date

 

 Clarithromycin  Bruising, Skin blotches, hives, SOB      2017

 

 Eaton Dye      hives  10/30/2017

 

 Spider Bites  Target rash      2018

 

 Bee Sting  Anaphylaxis      2018

 

 Kevzara  Nausea and Vomiting, Breathing, chest    Biologic  2018



   heaviness, Chest, ear dulce, site react      

 

 Azithromycin  Skin blotches, hives, SOB      2018

 

 Cephalexin  Anaphylaxis      2018

 

 Celebrex  dyspnea and rash      2019







Medications







 Active Medications  SIG  Qnty  Indications  Ordering  Date



         Provider  

 

 Methotrexate Sodium  inject 0.5  10ml    [place]  



                50mg/2ML  milliliters under      KATHERIN Wood  9



 Solution  the skin once a        



   week as directed        

 

 Folic Acid  take one  90tabs    [facility]  



       1mg Tablets  capsule/tablet      KATHERIN Wood  9



   daily by mouth        

 

 BD 1ML Tuberculin  use for weekly  12units    [facility]  



 Syringe/Safetyglide TB  injection of      KATHERIN Wood  9



 Needle 27GX1/2"  methotrexate        



            27G X 1/2" 1          



 ML Misc          

 

 Paroxetine HCL  Take One Tablet By  30tabs    [place]  



           20mg Tablets  Mouth Every Day;      KATHERIN Wood  9



   Stop Cymbalta        

 

 Trazodone HCL  Take One Tablet By  30tabs    [place]  



          50mg Tablets  Mouth AT Bedtime as      KATHERIN Wood



   Needed For Insomnia        

 

 Pantoprazole Sodium  take one tablet by  30tabs    [place]  



                40mg  mouth every day      KATHERIN Wood  8



 Tablets DR Boo/Adjustable/Aluminum  use daily to help  1units  M06.9  [place]  



 /Round Handle 5/8"  with ambulation      KATHERIN Wood  8



               58" Misc  with a 4 point        



   prong        

 

 Morphine Sulfate ER  Take One Tablet By  30tabs    [place]  



                15mg  Mouth AT Bedtime;      KATHERIN Wood  8



 Tablets ER  Max Daily Dose = 1        



           

 

 Alprazolam  Take 1 Tablet By  14tabs    [place]  



       0.25mg Tablets  Mouth as Needed For      KATHERIN Wood  8



   Panic Attacks;        



   Maximum Daily Dose        



   = 1        

 

 Gabapentin  Take one  60caps  M79.7  [facility]  



       300mg Capsules  capsule/tab every 8      KATHERIN Wood  7



   hours (total of 3        



   per day)        

 

 Neoprene Patella Knee  use daily for the  2units  M25.561  [place]  



 Support/Large  right knee for      KATHERIN Wood  7



           Misc  stabilization and        



   to prevent        



   hyperextension        

 

 Fexofenadine HCL  once daily      Unknown  



             180mg          0



 Tablets          

 

 Vitamin D3  1 by mouth every      Unknown  



       5000Unit Capsules  day        0



           

 

 Hydroxychloroquine  take two tablets  180tabs    Bertrand  



 Sulfate  once daily      KATHERIN Wood  0



    200mg Tablets          



           

 

 Proair HFA  2 puffs by mouth      Unknown  



       108(90Base)  every 4 hours as        0



 mcg/Act Aerosol  needed        



           

 

 Tums E-X 750  4 to 6 by mouth as      Unknown  



         750mg Chewtabs  needed        0



           

 

 Zicam Cold Remedy  as needed      Unknown  



               Tablets          0



 Dispers          

 

 Epipen 2-[place]  use as directed      Unknown  



         0.3mg/0.3ML          0



 Solution Auto-Inject          



           

 

 Ondansetron  Take 1 Tablet By      Unknown  



        4mg Tablets  Mouth Every 8 Hours        0



 Dispers  as Needed For        



   Nausea        

 

 Biotin        Unknown  



   1mg Capsules          0



           









 History Medications









 Celebrex  1 by mouth twice a day as  30caps  M06.09  Bertrand Sabar,  2019 -



   needed for      M.DLetha  2019



 100mg Capsules  pain/inflammation. avoid        



   other nsaids        







Immunizations







 CPT Code  Status  Date  Vaccine  Reaction  Lot #

 

 28031  Given  2019  Pneumonia Vaccine  No immediate adverse  [id number]



         effects noted or  



         reported  

 

 57703  Given  2019  Influenza Virus Vaccine,    [id number]



       Quadrivalent, Split,    



       Preservative Free    

 

 29517  Given  2018  Influenza Virus Vaccine,  no immediate reaction  
[id number]



       Quadrivalent, Split,    



       Preservative Free    

 

 85085  Given  2018  Pneumococcal Conjugate  no immediate reaction  [id number]



       Vaccine 13 Valent For  notedt  



       Intramuscular Use    







Vital Signs







 Date  Vital  Result  Comment

 

 2019 11:18am  Height  68 inches  5'8"









 Weight  337.00 lb  

 

 Heart Rate  82 /min  

 

 BP Systolic Sitting  130 mmHg  

 

 BP Diastolic Sitting  84 mmHg  

 

 Respiratory Rate  16 /min  

 

 Body Temperature  97.4 F  

 

 BMI (Body Mass Index)  51.2 kg/m2  









 2019 10:28am  Height  68 inches  5'8"









 Weight  352.00 lb  

 

 Heart Rate  88 /min  

 

 BP Systolic Sitting  122 mmHg  

 

 BP Diastolic Sitting  80 mmHg  

 

 Pain Level  8  

 

 O2 % BldC Oximetry  98 %  

 

 BMI (Body Mass Index)  53.5 kg/m2  







Results







 Test  Acquired Date  Facility  Test  Result  H/L  Range  Note

 

 Laboratory test  10/22/2019  [facility]  Erythrocyte Sed  17 mm/Hr  
Normal  0-19  



 finding    [address]  Rate        



     [place] [address] [phone]          









 C Reactive Protein  7.46 mg/L  Normal  <8.01  









 CBC Auto  10/22/2019  [facility]  White Blood  8.3 10^3/uL  Normal  
3.5-10.8  



 Diff    [address]  Count        



     [place] [address] [phone]          









 Red Blood Count  4.37 10^6/uL  Normal  3.70-4.87  

 

 Hemoglobin  12.7 g/dL  Normal  12.0-16.0  

 

 Hematocrit  38 %  Normal  35-47  

 

 Mean Corpuscular Volume  87 fL  Normal  80-97  

 

 Mean Corpuscular Hemoglobin  29 pg  Normal  27-31  

 

 Mean Corpuscular HGB Conc  33 g/dL  Normal  31-36  

 

 Red Cell Distribution Width  16 %  High  10-15  

 

 Platelet Count  176 10^3/uL  Normal  150-450  

 

 Mean Platelet Volume  9.3 fL  Normal  7.4-10.4  

 

 Abs Neutrophils  5.7 10^3/uL  Normal  1.5-7.7  

 

 Abs Lymphocytes  1.9 10^3/uL  Normal  1.0-4.8  

 

 Abs Monocytes  0.6 10^3/uL  Normal  0-0.8  

 

 Abs Eosinophils  0.1 10^3/uL  Normal  0-0.6  

 

 Abs Basophils  0.0 10^3/uL  Normal  0-0.2  

 

 Abs Nucleated RBC  0.0 10^3/uL      

 

 Granulocyte %  69.1 %      

 

 Lymphocyte %  22.8 %      

 

 Monocyte %  7.2 %      

 

 Eosinophil %  0.6 %      

 

 Basophil %  0.3 %      

 

 Nucleated Red Blood Cells %  0.2      









 Comp Metabolic  10/22/2019  [facility]  Sodium  138 mmol/L  Normal  
135-145  



 Panel    [address]          



     [place] [address] [phone]          









 Potassium  3.7 mmol/L  Normal  3.5-5.0  

 

 Chloride  104 mmol/L  Normal  101-111  

 

 Co2 Carbon Dioxide  26 mmol/L  Normal  22-32  

 

 Anion Gap  8 mmol/L  Normal  2-11  

 

 Glucose  79 mg/dL  Normal    

 

 Blood Urea Nitrogen  10 mg/dL  Normal  6-24  

 

 Creatinine  1.14 mg/dL  High  0.51-0.95  

 

 BUN/Creatinine Ratio  8.8  Normal  8-20  

 

 Calcium  9.4 mg/dL  Normal  8.6-10.3  

 

 Total Protein  6.6 g/dL  Normal  6.4-8.9  

 

 Albumin  4.0 g/dL  Normal  3.2-5.2  

 

 Globulin  2.6 g/dL  Normal  2-4  

 

 Albumin/Globulin Ratio  1.5  Normal  1-3  

 

 Total Bilirubin  0.70 mg/dL  Normal  0.2-1.0  

 

 Alkaline Phosphatase  62 U/L  Normal    

 

 Alt  25 U/L  Normal  7-52  

 

 Ast  26 U/L  Normal  13-39  

 

 Egfr Non-  52.8    >60  

 

 Egfr   63.9    >60  1









 Laboratory test  2019  [facility]  C Reactive  8.09 mg/L  
High  <8.01  



 finding    [address]  Protein        



     [place] [address] [phone]          









 Erythrocyte Sed Rate  15 mm/Hr  Normal  0-19  









 CBC Auto  2019  [facility]  White Blood  7.7 10^3/uL  Normal  
3.5-10.8  



 Diff    [address]  Count        



     [place] [address] [phone]          









 Red Blood Count  4.03 10^6/uL  Normal  3.70-4.87  

 

 Hemoglobin  12.1 g/dL  Normal  12.0-16.0  

 

 Hematocrit  35 %  Normal  35-47  

 

 Mean Corpuscular Volume  87 fL  Normal  80-97  

 

 Mean Corpuscular Hemoglobin  30 pg  Normal  27-31  

 

 Mean Corpuscular HGB Conc  34 g/dL  Normal  31-36  

 

 Red Cell Distribution Width  16 %  High  10-15  

 

 Platelet Count  173 10^3/uL  Normal  150-450  

 

 Mean Platelet Volume  9.8 fL  Normal  7.4-10.4  

 

 Abs Neutrophils  4.3 10^3/uL  Normal  1.5-7.7  

 

 Abs Lymphocytes  2.8 10^3/uL  Normal  1.0-4.8  

 

 Abs Monocytes  0.5 10^3/uL  Normal  0-0.8  

 

 Abs Eosinophils  0.1 10^3/uL  Normal  0-0.6  

 

 Abs Basophils  0.0 10^3/uL  Normal  0-0.2  

 

 Abs Nucleated RBC  0.0 10^3/uL      

 

 Granulocyte %  55.2 %      

 

 Lymphocyte %  36.1 %      

 

 Monocyte %  7.0 %      

 

 Eosinophil %  1.3 %      

 

 Basophil %  0.4 %      

 

 Nucleated Red Blood Cells %  0.0      









 Comp Metabolic  2019  [facility]  Sodium  140 mmol/L  Normal  
135-145  



 Panel    [address]          



     [place] [address] [phone]          









 Potassium  4.1 mmol/L  Normal  3.5-5.0  

 

 Chloride  107 mmol/L  Normal  101-111  

 

 Co2 Carbon Dioxide  30 mmol/L  Normal  22-32  

 

 Anion Gap  3 mmol/L  Normal  2-11  

 

 Calcium  8.4 mg/dL  Low  8.6-10.3  

 

 Albumin  3.5 g/dL  Normal  3.2-5.2  

 

 Total Bilirubin  0.50 mg/dL  Normal  0.2-1.0  

 

 Glucose  91 mg/dL  Normal    

 

 Blood Urea Nitrogen  13 mg/dL  Normal  6-24  

 

 Creatinine  1.14 mg/dL  High  0.51-0.95  

 

 BUN/Creatinine Ratio  11.4  Normal  8-20  

 

 Total Protein  5.4 g/dL  Low  6.4-8.9  

 

 Globulin  1.9 g/dL  Low  2-4  

 

 Albumin/Globulin Ratio  1.8  Normal  1-3  

 

 Alkaline Phosphatase  57 U/L  Normal    

 

 Alt  21 U/L  Normal  7-52  

 

 Ast  26 U/L  Normal  13-39  

 

 Egfr Non-  52.8    >60  

 

 Egfr   63.9    >60  2









 Laboratory test  2019  [facility]  Erythrocyte Sed  3 mm/Hr  
Normal  0-19  3



 finding    [address]  Rate        



     [place] [address] [phone]          









 C Reactive Protein  < 1.00 mg/L  Normal  <8.01  4









 CBC Auto  2019  [facility]  White Blood  8.9 10^3/uL  Normal  
3.5-10.8  



 Diff    [address]  Count        



     [place] [address] [phone]          









 Red Blood Count  4.85 10^6/uL  Normal  3.70-4.87  

 

 Hemoglobin  14.4 g/dL  Normal  12.0-16.0  

 

 Hematocrit  42 %  Normal  35-47  

 

 Mean Corpuscular Volume  87 fL  Normal  80-97  

 

 Mean Corpuscular Hemoglobin  30 pg  Normal  27-31  

 

 Mean Corpuscular HGB Conc  34 g/dL  Normal  31-36  

 

 Red Cell Distribution Width  17 %  High  10-15  

 

 Platelet Count  157 10^3/uL  Normal  150-450  

 

 Mean Platelet Volume  9.7 fL  Normal  7.4-10.4  

 

 Abs Neutrophils  5.4 10^3/uL  Normal  1.5-7.7  

 

 Abs Lymphocytes  2.8 10^3/uL  Normal  1.0-4.8  

 

 Abs Monocytes  0.5 10^3/uL  Normal  0-0.8  

 

 Abs Eosinophils  0.1 10^3/uL  Normal  0-0.6  

 

 Abs Basophils  0.1 10^3/uL  Normal  0-0.2  

 

 Abs Nucleated RBC  0.0 10^3/uL      

 

 Granulocyte %  60.2 %      

 

 Lymphocyte %  31.9 %      

 

 Monocyte %  5.9 %      

 

 Eosinophil %  1.4 %      

 

 Basophil %  0.6 %      

 

 Nucleated Red Blood Cells %  0.0      









 Comp Metabolic  2019  [facility]  Sodium  139 mmol/L  Normal  
135-145  



 Panel    [address]  [place] [address] [phone]          









 Potassium  4.4 mmol/L  Normal  3.5-5.0  

 

 Chloride  107 mmol/L  Normal  101-111  

 

 Co2 Carbon Dioxide  25 mmol/L  Normal  22-32  

 

 Anion Gap  7 mmol/L  Normal  2-11  

 

 Glucose  82 mg/dL  Normal    

 

 Blood Urea Nitrogen  12 mg/dL  Normal  6-24  

 

 Creatinine  1.14 mg/dL  High  0.51-0.95  

 

 BUN/Creatinine Ratio  10.5  Normal  8-20  

 

 Calcium  9.2 mg/dL  Normal  8.6-10.3  

 

 Total Protein  6.4 g/dL  Normal  6.4-8.9  

 

 Albumin  4.4 g/dL  Normal  3.2-5.2  

 

 Globulin  2.0 g/dL  Normal  2-4  

 

 Albumin/Globulin Ratio  2.2  Normal  1-3  

 

 Total Bilirubin  0.60 mg/dL  Normal  0.2-1.0  

 

 Alkaline Phosphatase  64 U/L  Normal    

 

 Alt  38 U/L  Normal  7-52  

 

 Ast  33 U/L  Normal  13-39  

 

 Egfr Non-  52.8    >60  

 

 Egfr   63.9    >60  5









 Laboratory  2019  [facility]  TSH (Thyroid  2.16  Normal  0.34
-5.60  6



 test finding    [address] [facility]  Stim Horm)  mcIU/mL      



     [place] [address] [phone]          

 

 Urinalysis  2019  [facility]  Urine Color  Yellow      



 Profile    [address] [place] [address] [phone]          









 Urine Appearance  Cloudy      

 

 Urine Specific Gravity  1.006  Low  1.010-1.030  

 

 Urine pH  7.0  Normal  5-9  

 

 Urine Urobilinogen  Negative    Negative  

 

 Urine Ketones  Negative    Negative  

 

 Urine Protein  Negative    Negative  

 

 Urine Leukocytes  Negative    Negative  

 

 Urine Blood  Negative    Negative  

 

 Urine Nitrite  Negative    Negative  

 

 Urine Bilirubin  Negative    Negative  

 

 Urine Glucose  Negative    Negative  









 Drug Abuse 20  2019  [facility]  Urine Amphetamine  Negative 
ng/mL      7



 Urine    [address] [place], [place] [id number] [phone]          









 Urine Barbiturates  Negative ng/mL      8

 

 Urine Benzodiazepines  Negative ng/mL      9

 

 Urine Cocaine  Negative ng/mL      10

 

 Urine Phencyclidine  Negative ng/mL    Cutoff: 25  

 

 Urine Tetrahydrocannabinol  Negative ng/mL    Cutoff: 50  11

 

 Creatinine, Urine  40.6 mg/dL      

 

 Specific Gravity  1.004      

 

 pH  7.4      

 

 Oxidants  Negative      12

 

 Adulterants Comment  Normal      

 

 Codeine, Ur  Not Detected ng/mL    Cutoff: 25  13

 

 Codeine-6-beta-glucuronide, Ur  Not Detected ng/mL      14

 

 Morphine, Ur  Not Detected ng/mL    Cutoff: 25  15

 

 Morphine-6-beta-glucuronide, U  Not Detected ng/mL      16

 

 6-monoacetylmorphine, Ur  Not Detected ng/mL    Cutoff: 25  17

 

 Hydrocodone, Ur  Present ng/mL  Abnormal  Cutoff: 25  18

 

 Norhydrocodone, Ur  Present ng/mL  Abnormal  Cutoff: 25  19

 

 Dihydrocodeine, Ur  Present ng/mL  Abnormal  Cutoff: 25  20

 

 Hydromorphone, Ur  Not Detected ng/mL    Cutoff: 25  21

 

 Fxclganrzhfgz3qhdkbjkgkybkmea  Not Detected ng/mL      22

 

 Oxycodone, Ur  Not Detected ng/mL    Cutoff: 25  23

 

 Noroxycodone, Ur  Not Detected ng/mL    Cutoff: 25  24

 

 Oxymorphone, Ur  Not Detected ng/mL    Cutoff: 25  25

 

 Oxymorphone-3-beta-glucuronide  Not Detected ng/mL      26

 

 Noroxymorphone, Ur  Not Detected ng/mL    Cutoff: 25  27

 

 Fentanyl, Ur  Not Detected ng/mL    Cutoff: 2  28

 

 Norfentanyl, Ur  Not Detected ng/mL    Cutoff: 2  29

 

 Meperidine, Ur  Not Detected ng/mL    Cutoff: 25  30

 

 Normeperidine, Ur  Not Detected ng/mL    Cutoff: 25  31

 

 Naloxone, Ur  Not Detected ng/mL    Cutoff: 25  32

 

 Naloxone-3-beta-glucuronide, U  Not Detected ng/mL      33

 

 Methadone, Ur  Not Detected ng/mL    Cutoff: 25  34

 

 Eddp, Ur  Not Detected ng/mL    Cutoff: 25  35

 

 Propoxyphene, Ur  Not Detected ng/mL    Cutoff: 25  36

 

 Norpropoxyphene, Ur  Not Detected ng/mL    Cutoff: 25  37

 

 Tramadol, Ur  Not Detected ng/mL    Cutoff: 25  38

 

 O-desmethyltramadol, Ur  Not Detected ng/mL    Cutoff: 25  39

 

 Tapentadol, Ur  Not Detected ng/mL    Cutoff: 25  40

 

 N-desmethyltapentadol, Ur  Not Detected ng/mL    Cutoff: 50  41

 

 Tapentadol-beta-glucuronide, U  Not Detected ng/mL      42

 

 Buprenorphine, Ur  Not Detected ng/mL    Cutoff: 5  43

 

 Norbuprenorphine, Ur  Not Detected ng/mL    Cutoff: 5  44

 

 Norbuprenorphine glucuronide  Not Detected ng/mL    Cutoff: 20  45

 

 Opioid Interpretation  See Comment      46









 1  *******Because ethnic data is not always readily available,



   this report includes an eGFR for both -Americans and



   non- Americans.****



   The National Kidney Disease Education Program (NKDEP) does



   not endorse the use of the MDRD equation for patients that



   are not between the ages of 18 and 70, are pregnant, have



   extremes of body size, muscle mass, or nutritional status,



   or are non- or non-.



   According to the National Kidney Foundation, irrespective of



   diagnosis, the stage of the disease is based on the level of



   kidney function:



   Stage Description                      GFR(mL/min/1.73 m(2))



   1     Kidney damage with normal or decreased GFR       90



   2     Kidney damage with mild decrease in GFR          60-89



   3     Moderate decrease in GFR                         30-59



   4     Severe decrease in GFR                           15-29



   5     Kidney failure                       <15 (or dialysis)

 

 2  *******Because ethnic data is not always readily available,



   this report includes an eGFR for both -Americans and



   non- Americans.****



   The National Kidney Disease Education Program (NKDEP) does



   not endorse the use of the MDRD equation for patients that



   are not between the ages of 18 and 70, are pregnant, have



   extremes of body size, muscle mass, or nutritional status,



   or are non- or non-.



   According to the National Kidney Foundation, irrespective of



   diagnosis, the stage of the disease is based on the level of



   kidney function:



   Stage Description                      GFR(mL/min/1.73 m(2))



   1     Kidney damage with normal or decreased GFR       90



   2     Kidney damage with mild decrease in GFR          60-89



   3     Moderate decrease in GFR                         30-59



   4     Severe decrease in GFR                           15-29



   5     Kidney failure                       <15 (or dialysis)

 

 3  Please check labs today

 

 4  Please check labs today

 

 5  *******Because ethnic data is not always readily available,



   this report includes an eGFR for both -Americans and



   non- Americans.****



   The National Kidney Disease Education Program (NKDEP) does



   not endorse the use of the MDRD equation for patients that



   are not between the ages of 18 and 70, are pregnant, have



   extremes of body size, muscle mass, or nutritional status,



   or are non- or non-.



   According to the National Kidney Foundation, irrespective of



   diagnosis, the stage of the disease is based on the level of



   kidney function:



   Stage Description                      GFR(mL/min/1.73 m(2))



   1     Kidney damage with normal or decreased GFR       90



   2     Kidney damage with mild decrease in GFR          60-89



   3     Moderate decrease in GFR                         30-59



   4     Severe decrease in GFR                           15-29



   5     Kidney failure                       <15 (or dialysis)

 

 6  Please check labs today

 

 7  -------------------REFERENCE VALUE--------------------------



   Cutoff: 500

 

 8  -------------------REFERENCE VALUE--------------------------



   Cutoff: 200

 

 9  -------------------REFERENCE VALUE--------------------------



   Cutoff: 100

 

 10  -------------------REFERENCE VALUE--------------------------



   Cutoff: 150

 

 11  -------------------ADDITIONAL INFORMATION-------------------



   This report is intended for use in clinical monitoring or



   management of patients.  It is not intended for use in



   employment-related testing.



   Test Performed by:



   [facility] JIT Sola[facility] - [facility]



   [address] [facility], [place] [address]

 

 12  -------------------REFERENCE VALUE--------------------------



   Cutoff: 200 mg/L

 

 13  Tylenol 3

 

 14  Metabolite of codeine



   -------------------REFERENCE VALUE--------------------------



   Cutoff: 100

 

 15  Barbara Montoya, MS Contin; Also a minor metabolite (10%) of



   codeine and can be seen in low concentrations (<2,000



   ng/mL) with poppy seed ingestion.

 

 16  Metabolite of morphine



   -------------------REFERENCE VALUE--------------------------



   Cutoff: 100

 

 17  Metabolite of heroin

 

 18  Lortab, Norco, Vicodin; Also a very minor metabolite of



   codeine and impurity (<1%) of oxycodone.

 

 19  Metabolite of hydrocodone

 

 20  Metabolite of hydrocodone

 

 21  Dilaudid, Exalgo; Also a metabolite of hydrocodone and a



   minor (<5%) metabolite of morphine.

 

 22  Metabolite of hydromorphone



   -------------------REFERENCE VALUE--------------------------



   Cutoff: 100

 

 23  Endocet, Percocet, Oxycontin

 

 24  Metabolite of oxycodone

 

 25  Numorphan, Opana; Also a metabolite of oxycodone.

 

 26  Metabolite of oxymorphone



   -------------------REFERENCE VALUE--------------------------



   Cutoff: 100

 

 27  Metabolite of oxymorphone

 

 28  Actiq, Duragesic, Fentora

 

 29  Metabolite of fentanyl

 

 30  Demerol

 

 31  Metabolite of meperidine

 

 32  Narcan

 

 33  Metabolite of naloxone



   -------------------REFERENCE VALUE--------------------------



   Cutoff: 100

 

 34  Dolophine

 

 35  Metabolite of methadone

 

 36  Darvon, Darvocet

 

 37  Metabolite of propoxyphene

 

 38  Tradol, Ultram, Ultracet

 

 39  Metabolite of tramadol

 

 40  Nucynta

 

 41  Metabolite of tapentadol

 

 42  Metabolite of tapentadol



   -------------------REFERENCE VALUE--------------------------



   Cutoff: 100

 

 43  Buprenex, Suboxone

 

 44  Metabolite of buprenorphine

 

 45  Metabolite of buprenorphine

 

 46  Test detected the presence of hydrocodone and its



   metabolites (norhydrocodone and dihydrocodeine). Suspect



   use of hydrocodone within the past three days.



   -------------------ADDITIONAL INFORMATION-------------------



   This test was developed and its performance characteristics



   determined by [facility] in a manner consistent with CLIA



   requirements. This test has not been cleared or approved by



   the U.S. Food and Drug Administration.







Procedures







 Date  Code  Description  Status

 

 2019  03441  Nerve Conduction 11-12 Studies  Completed

 

 2019  42315  Needle Electromyography Complete, Five Or More Muscles  
Completed



     Studied  

 

 10/04/2018  75003533  Mammogram  Completed







[employer]







 Description

 

 No Information Available







Encounters







 Type  Date  Location  Provider  Dx  Diagnosis

 

 Office Visit  2019  Rheumatology  SAMMY Sabillon  Rheumatoid



   10:40a  [facility]    arthritis w/o



           rheumatoid



           factor, multiple



           sites









 Z79.899  Other long term (current) drug therapy

 

 M79.7  Fibromyalgia

 

 M51.36  Other intervertebral disc degeneration, lumbar region

 

 R20.8  Other disturbances of skin sensation









 Office Visit  2019  9:40a  Rheumatology  Bertrand [id number]  Rheumatoid



     [facility] M.D[facility]    arthritis w/o



           rheumatoid



           factor, multiple



           sites









 Z79.899  Other long term (current) drug therapy

 

 M79.7  Fibromyalgia

 

 M51.36  Other intervertebral disc degeneration, lumbar region







Assessments







 Date  Code  Description  Provider

 

 2019  M06.09  Rheumatoid arthritis without rheumatoid  Bertrand Wood M.D.



     factor, multiple sit  

 

 2019  M54.16  Radiculopathy, lumbar region  Bertrand Wood M.D.

 

 2019  Z79.899  Other long term (current) drug therapy  Bertrand Wood M.D.

 

 2019  M79.7  Fibromyalgia  Bertrand Wood M.D.

 

 2019  M54.16  Radiculopathy, lumbar region  Juan Davis M.D.

 

 2019  M79.641  Pain in right hand  Juan Davis M.D.

 

 2019  M79.642  Pain in left hand  Juan Davis M.D.

 

 2019  M06.09  Rheumatoid arthritis without rheumatoid  KATHERIN Sabillon, multiple sit  

 

 2019  Z79.899  Other long term (current) drug therapy  Bertrand Wood M.D.

 

 2019  M79.7  Fibromyalgia  Bertrand Wood M.D.

 

 2019  M51.36  Other intervertebral disc degenerationBertrand M.D.



     lumbar region  

 

 2019  R20.8  Other disturbances of skin sensation  Bertrand Wood M.D.

 

 2019  M06.09  Rheumatoid arthritis without rheumatoid  KATHERIN Sabillon, multiple sit  

 

 2019  Z79.899  Other long term (current) drug therapy  Bertrand Wood M.D.

 

 2019  M79.7  Fibromyalgia  Bertrand Wood M.D.

 

 2019  M51.36  Other intervertebral disc degeneration,  Bertrand Wood M.D.



     lumbar region  







Plan of Treatment

Future Appointment(s):2020  1:20 pm - Bertrand Wood M.D. at [facility] 
[facility]2019 - Bertrand Wood M.D.M06.09 Rheumatoid arthritis 
without rheumatoid factor, multiple sitM54.16 Radiculopathy, lumbar regionNew 
Therapy:Physical DvlypdjF64.899 Other long term (current) drug therapyFollow up:
Follow up in 6 weeks or sooner if lkqvxjX14.7 Fibromyalgia



Functional Status







 Description

 

 No Information Available







Mental Status







 Description

 

 No Information Available







Referrals







 Refer to   Reason for Referral  Status  Appt Date

 

 Pain Clinic  Please assess and treat patient with severe sciatic  Sent  00/00/
0000



   pain in the spine; she has MRI confirmed radiographic    



   degenerative disc disease.  MRI Lumbar Spine pending    









 [address] [place] [id number] [phone]

## 2019-12-28 NOTE — PN
Progress Note





- Progress Note


Date of Service: 12/28/19


Note: 





Psychiatrist evaluation note:


Manisha Nash was brought in by police after raising concerns with a person 

she chatted with on what was reported to me as an online suicide prevention 

website.  She became upset when her daughter told her that she had not invited 

any of her friends over or do anything with them because she was afraid the 

patient would have a syncopal episode.  This was concerning to the patient as a 

sign that her daughter worries too much about her.  Ms. Nash reports that 

she has pain issues from rheumatoid disease that she finds distressing.  Ms. Nash reports taking gabapentin and hydroxychloroquine under care from 

rheumatologist Dr. Wood.





Ms. Nash reports her mood as tired today, but on most days happy, although a 

little grumpy when she wakes up because of stiffness.  Sleep is poor, in so far 

as she never feels like she gets enough rest.  She attributes this to her 

autoimmune diseases.  Denies anhedonia.  Some days she will feel worthless when 

she cannot get done what she needs to because of her medical issues, but not 

most days, perhaps 1 or 2 days of the week.  Energy is usually low due to 

medical issues.  Denies any difficulty making decisions, but some days has 

difficulty with concentration, perhaps 3 of 7 days of the week.  Denies 

appetite or weight changes.  Denies passive or active SI.  Denies intent or 

plan to harm herself.  Reports statements made to the person she had the online 

chat with last evening were just shooting my mouth off out of frustration.  

Symptoms are inadequate to support diagnosis with a major depressive episode, 

seem to primarily reflect constitutional effects of medical illness.





Denies ever having any episode of dandre or psychosis.





Denies ever abuse of substances.





 has reported that he assesses his wife as entirely safe for discharge.





She reports taking Paxil against depression and anxiety prescribed by her 

rheumatologist, before that by Dr. Salomon, her PCP.





Denies ever having been prescribed medications by a psychiatrist, only having 

counseling at [facility] for just 4 or 5 sessions with someone she remembers as Safia

.  Has not continued care since moving to [facility].    She agrees to 

referral for continued care in [facility], especially in light of this 

crisis evaluation.





I spoke with her  Markus.  He stated he had no concerns about his wife

s safety if discharged home.  He reported having seen no signs and heard no 

report that she was in any way suicidal.  He did understand that she had been 

upset about the situation with their daughters concerns and its impact on his 

wife.  He agreed that she might benefit from referral to outpatient care to 

address these issues through counseling.





ON the basis of review of the medical record, discussion of the case with 

evaluators Juice and Moshe, and interview of the patient and her , I 

find no indication of imminent risk requiring continued hospitalization, and 

will therefore authorize discharge of the patient to home per her and her 

husbands wishes.  I have asked Moshe to make referral to [facility] for counseling.